# Patient Record
Sex: MALE | Race: WHITE | NOT HISPANIC OR LATINO
[De-identification: names, ages, dates, MRNs, and addresses within clinical notes are randomized per-mention and may not be internally consistent; named-entity substitution may affect disease eponyms.]

---

## 2017-02-03 ENCOUNTER — APPOINTMENT (OUTPATIENT)
Dept: PULMONOLOGY | Facility: CLINIC | Age: 82
End: 2017-02-03

## 2017-02-03 VITALS
SYSTOLIC BLOOD PRESSURE: 120 MMHG | BODY MASS INDEX: 27.28 KG/M2 | OXYGEN SATURATION: 94 % | HEIGHT: 68 IN | WEIGHT: 180 LBS | DIASTOLIC BLOOD PRESSURE: 60 MMHG | HEART RATE: 82 BPM | RESPIRATION RATE: 17 BRPM

## 2017-04-27 ENCOUNTER — FORM ENCOUNTER (OUTPATIENT)
Age: 82
End: 2017-04-27

## 2017-04-28 ENCOUNTER — APPOINTMENT (OUTPATIENT)
Dept: CT IMAGING | Facility: CLINIC | Age: 82
End: 2017-04-28

## 2017-04-28 ENCOUNTER — OUTPATIENT (OUTPATIENT)
Dept: OUTPATIENT SERVICES | Facility: HOSPITAL | Age: 82
LOS: 1 days | End: 2017-04-28
Payer: MEDICARE

## 2017-04-28 DIAGNOSIS — J44.9 CHRONIC OBSTRUCTIVE PULMONARY DISEASE, UNSPECIFIED: ICD-10-CM

## 2017-04-28 PROCEDURE — 71250 CT THORAX DX C-: CPT

## 2017-05-03 ENCOUNTER — APPOINTMENT (OUTPATIENT)
Dept: PULMONOLOGY | Facility: CLINIC | Age: 82
End: 2017-05-03

## 2017-05-03 VITALS
SYSTOLIC BLOOD PRESSURE: 110 MMHG | BODY MASS INDEX: 27.28 KG/M2 | OXYGEN SATURATION: 92 % | HEIGHT: 68 IN | WEIGHT: 180 LBS | RESPIRATION RATE: 16 BRPM | DIASTOLIC BLOOD PRESSURE: 60 MMHG | HEART RATE: 79 BPM

## 2017-06-15 ENCOUNTER — INPATIENT (INPATIENT)
Facility: HOSPITAL | Age: 82
LOS: 3 days | Discharge: HOME | DRG: 309 | End: 2017-06-19
Attending: INTERNAL MEDICINE | Admitting: INTERNAL MEDICINE
Payer: MEDICARE

## 2017-06-15 VITALS
HEART RATE: 82 BPM | DIASTOLIC BLOOD PRESSURE: 74 MMHG | OXYGEN SATURATION: 94 % | SYSTOLIC BLOOD PRESSURE: 179 MMHG | RESPIRATION RATE: 16 BRPM

## 2017-06-15 DIAGNOSIS — J44.9 CHRONIC OBSTRUCTIVE PULMONARY DISEASE, UNSPECIFIED: ICD-10-CM

## 2017-06-15 DIAGNOSIS — E78.5 HYPERLIPIDEMIA, UNSPECIFIED: ICD-10-CM

## 2017-06-15 DIAGNOSIS — Z90.2 ACQUIRED ABSENCE OF LUNG [PART OF]: Chronic | ICD-10-CM

## 2017-06-15 DIAGNOSIS — N18.9 CHRONIC KIDNEY DISEASE, UNSPECIFIED: ICD-10-CM

## 2017-06-15 DIAGNOSIS — I48.91 UNSPECIFIED ATRIAL FIBRILLATION: ICD-10-CM

## 2017-06-15 DIAGNOSIS — I10 ESSENTIAL (PRIMARY) HYPERTENSION: ICD-10-CM

## 2017-06-15 LAB
ALBUMIN SERPL ELPH-MCNC: 4.1 G/DL — SIGNIFICANT CHANGE UP (ref 3.3–5)
ALP SERPL-CCNC: 45 U/L — SIGNIFICANT CHANGE UP (ref 40–120)
ALT FLD-CCNC: 19 U/L RC — SIGNIFICANT CHANGE UP (ref 10–45)
ANION GAP SERPL CALC-SCNC: 13 MMOL/L — SIGNIFICANT CHANGE UP (ref 5–17)
APPEARANCE UR: CLEAR — SIGNIFICANT CHANGE UP
APTT BLD: 28.2 SEC — SIGNIFICANT CHANGE UP (ref 27.5–37.4)
AST SERPL-CCNC: 20 U/L — SIGNIFICANT CHANGE UP (ref 10–40)
BACTERIA # UR AUTO: ABNORMAL /HPF
BASOPHILS # BLD AUTO: 0.1 K/UL — SIGNIFICANT CHANGE UP (ref 0–0.2)
BASOPHILS NFR BLD AUTO: 0.5 % — SIGNIFICANT CHANGE UP (ref 0–2)
BILIRUB SERPL-MCNC: 0.4 MG/DL — SIGNIFICANT CHANGE UP (ref 0.2–1.2)
BILIRUB UR-MCNC: NEGATIVE — SIGNIFICANT CHANGE UP
BUN SERPL-MCNC: 32 MG/DL — HIGH (ref 7–23)
CALCIUM SERPL-MCNC: 10.7 MG/DL — HIGH (ref 8.4–10.5)
CHLORIDE SERPL-SCNC: 101 MMOL/L — SIGNIFICANT CHANGE UP (ref 96–108)
CK MB BLD-MCNC: 4.4 % — HIGH (ref 0–3.5)
CK MB CFR SERPL CALC: 2.3 NG/ML — SIGNIFICANT CHANGE UP (ref 0–6.7)
CK SERPL-CCNC: 52 U/L — SIGNIFICANT CHANGE UP (ref 30–200)
CO2 SERPL-SCNC: 26 MMOL/L — SIGNIFICANT CHANGE UP (ref 22–31)
COLOR SPEC: YELLOW — SIGNIFICANT CHANGE UP
COMMENT - URINE: SIGNIFICANT CHANGE UP
CREAT SERPL-MCNC: 2.41 MG/DL — HIGH (ref 0.5–1.3)
DIFF PNL FLD: NEGATIVE — SIGNIFICANT CHANGE UP
EOSINOPHIL # BLD AUTO: 0.1 K/UL — SIGNIFICANT CHANGE UP (ref 0–0.5)
EOSINOPHIL NFR BLD AUTO: 0.6 % — SIGNIFICANT CHANGE UP (ref 0–6)
GAS PNL BLDV: SIGNIFICANT CHANGE UP
GLUCOSE SERPL-MCNC: 104 MG/DL — HIGH (ref 70–99)
GLUCOSE UR QL: NEGATIVE — SIGNIFICANT CHANGE UP
HCT VFR BLD CALC: 37.5 % — LOW (ref 39–50)
HGB BLD-MCNC: 11.6 G/DL — LOW (ref 13–17)
INR BLD: 0.96 RATIO — SIGNIFICANT CHANGE UP (ref 0.88–1.16)
KETONES UR-MCNC: NEGATIVE — SIGNIFICANT CHANGE UP
LEUKOCYTE ESTERASE UR-ACNC: NEGATIVE — SIGNIFICANT CHANGE UP
LYMPHOCYTES # BLD AUTO: 32.4 % — SIGNIFICANT CHANGE UP (ref 13–44)
LYMPHOCYTES # BLD AUTO: 4.9 K/UL — HIGH (ref 1–3.3)
MCHC RBC-ENTMCNC: 31 PG — SIGNIFICANT CHANGE UP (ref 27–34)
MCHC RBC-ENTMCNC: 31.1 GM/DL — LOW (ref 32–36)
MCV RBC AUTO: 99.7 FL — SIGNIFICANT CHANGE UP (ref 80–100)
MONOCYTES # BLD AUTO: 1.2 K/UL — HIGH (ref 0–0.9)
MONOCYTES NFR BLD AUTO: 8.2 % — SIGNIFICANT CHANGE UP (ref 2–14)
NEUTROPHILS # BLD AUTO: 8.8 K/UL — HIGH (ref 1.8–7.4)
NEUTROPHILS NFR BLD AUTO: 58.3 % — SIGNIFICANT CHANGE UP (ref 43–77)
NITRITE UR-MCNC: NEGATIVE — SIGNIFICANT CHANGE UP
NT-PROBNP SERPL-SCNC: 706 PG/ML — HIGH (ref 0–300)
PH UR: 5.5 — SIGNIFICANT CHANGE UP (ref 5–8)
PLATELET # BLD AUTO: 307 K/UL — SIGNIFICANT CHANGE UP (ref 150–400)
POTASSIUM SERPL-MCNC: 4 MMOL/L — SIGNIFICANT CHANGE UP (ref 3.5–5.3)
POTASSIUM SERPL-SCNC: 4 MMOL/L — SIGNIFICANT CHANGE UP (ref 3.5–5.3)
PROT SERPL-MCNC: 7.4 G/DL — SIGNIFICANT CHANGE UP (ref 6–8.3)
PROT UR-MCNC: 30 MG/DL
PROTHROM AB SERPL-ACNC: 10.4 SEC — SIGNIFICANT CHANGE UP (ref 9.8–12.7)
RBC # BLD: 3.76 M/UL — LOW (ref 4.2–5.8)
RBC # FLD: 13.1 % — SIGNIFICANT CHANGE UP (ref 10.3–14.5)
RBC CASTS # UR COMP ASSIST: SIGNIFICANT CHANGE UP /HPF (ref 0–2)
SODIUM SERPL-SCNC: 140 MMOL/L — SIGNIFICANT CHANGE UP (ref 135–145)
SP GR SPEC: 1.02 — SIGNIFICANT CHANGE UP (ref 1.01–1.02)
TROPONIN T SERPL-MCNC: <0.01 NG/ML — SIGNIFICANT CHANGE UP (ref 0–0.06)
UROBILINOGEN FLD QL: NEGATIVE — SIGNIFICANT CHANGE UP
WBC # BLD: 15.1 K/UL — HIGH (ref 3.8–10.5)
WBC # FLD AUTO: 15.1 K/UL — HIGH (ref 3.8–10.5)
WBC UR QL: SIGNIFICANT CHANGE UP /HPF (ref 0–5)

## 2017-06-15 PROCEDURE — 99223 1ST HOSP IP/OBS HIGH 75: CPT | Mod: 25,AI

## 2017-06-15 PROCEDURE — 93010 ELECTROCARDIOGRAM REPORT: CPT

## 2017-06-15 PROCEDURE — 99285 EMERGENCY DEPT VISIT HI MDM: CPT | Mod: 25,GC

## 2017-06-15 PROCEDURE — 99497 ADVNCD CARE PLAN 30 MIN: CPT

## 2017-06-15 PROCEDURE — 71010: CPT | Mod: 26

## 2017-06-15 RX ORDER — ATORVASTATIN CALCIUM 80 MG/1
20 TABLET, FILM COATED ORAL AT BEDTIME
Qty: 0 | Refills: 0 | Status: DISCONTINUED | OUTPATIENT
Start: 2017-06-15 | End: 2017-06-19

## 2017-06-15 RX ORDER — DEXTROSE 50 % IN WATER 50 %
25 SYRINGE (ML) INTRAVENOUS ONCE
Qty: 0 | Refills: 0 | Status: DISCONTINUED | OUTPATIENT
Start: 2017-06-15 | End: 2017-06-19

## 2017-06-15 RX ORDER — INSULIN LISPRO 100/ML
VIAL (ML) SUBCUTANEOUS
Qty: 0 | Refills: 0 | Status: DISCONTINUED | OUTPATIENT
Start: 2017-06-15 | End: 2017-06-19

## 2017-06-15 RX ORDER — DEXTROSE 50 % IN WATER 50 %
12.5 SYRINGE (ML) INTRAVENOUS ONCE
Qty: 0 | Refills: 0 | Status: DISCONTINUED | OUTPATIENT
Start: 2017-06-15 | End: 2017-06-19

## 2017-06-15 RX ORDER — HEPARIN SODIUM 5000 [USP'U]/ML
5000 INJECTION INTRAVENOUS; SUBCUTANEOUS EVERY 12 HOURS
Qty: 0 | Refills: 0 | Status: DISCONTINUED | OUTPATIENT
Start: 2017-06-15 | End: 2017-06-16

## 2017-06-15 RX ORDER — SODIUM CHLORIDE 9 MG/ML
1000 INJECTION, SOLUTION INTRAVENOUS
Qty: 0 | Refills: 0 | Status: DISCONTINUED | OUTPATIENT
Start: 2017-06-15 | End: 2017-06-19

## 2017-06-15 RX ORDER — DILTIAZEM HCL 120 MG
1 CAPSULE, EXT RELEASE 24 HR ORAL
Qty: 0 | Refills: 0 | COMMUNITY

## 2017-06-15 RX ORDER — FENOFIBRATE,MICRONIZED 130 MG
1 CAPSULE ORAL
Qty: 0 | Refills: 0 | COMMUNITY

## 2017-06-15 RX ORDER — ASPIRIN/CALCIUM CARB/MAGNESIUM 324 MG
81 TABLET ORAL DAILY
Qty: 0 | Refills: 0 | Status: DISCONTINUED | OUTPATIENT
Start: 2017-06-15 | End: 2017-06-16

## 2017-06-15 RX ORDER — FENOFIBRATE,MICRONIZED 130 MG
145 CAPSULE ORAL DAILY
Qty: 0 | Refills: 0 | Status: DISCONTINUED | OUTPATIENT
Start: 2017-06-15 | End: 2017-06-19

## 2017-06-15 RX ORDER — ATORVASTATIN CALCIUM 80 MG/1
1 TABLET, FILM COATED ORAL
Qty: 0 | Refills: 0 | COMMUNITY

## 2017-06-15 RX ORDER — GLUCAGON INJECTION, SOLUTION 0.5 MG/.1ML
1 INJECTION, SOLUTION SUBCUTANEOUS ONCE
Qty: 0 | Refills: 0 | Status: DISCONTINUED | OUTPATIENT
Start: 2017-06-15 | End: 2017-06-19

## 2017-06-15 RX ORDER — DEXTROSE 50 % IN WATER 50 %
1 SYRINGE (ML) INTRAVENOUS ONCE
Qty: 0 | Refills: 0 | Status: DISCONTINUED | OUTPATIENT
Start: 2017-06-15 | End: 2017-06-19

## 2017-06-15 RX ADMIN — HEPARIN SODIUM 5000 UNIT(S): 5000 INJECTION INTRAVENOUS; SUBCUTANEOUS at 23:22

## 2017-06-15 RX ADMIN — ATORVASTATIN CALCIUM 20 MILLIGRAM(S): 80 TABLET, FILM COATED ORAL at 23:22

## 2017-06-15 RX ADMIN — Medication 10 MILLIGRAM(S): at 23:24

## 2017-06-15 NOTE — H&P ADULT - NSHPLABSRESULTS_GEN_ALL_CORE
labs reviewed personally by me with creat 2.41, calcium elevated 10.7, lact 2.3  ekg irregular with period bigeminy and ? period AF though p's seen  CXR negative  4/17 CT chest reviewed

## 2017-06-15 NOTE — H&P ADULT - HISTORY OF PRESENT ILLNESS
89 m ex alcoholic, ex smoker, pre-DM, htn, hld, ckd, copd/emphysema, h/o lung CA s/p LLL wedge resection sent from PMD office with new onset AF.  Pt has been feeling more ROWELL x 1 week with mild swelling of LEs.  Noted elevated BP monday night to 192/79 taken in [place] but Tuesday morning repeat 150/79.  ROWELL persisted and pt noted a morning cough that cleared upon getting up.    Pt started night home o2 following LLL wedge resection though progressed to requiring oxygen for 20 hrs/day after a hospitalization 1.5 yrs ago for PNA when orthopnea requiring a wedge and mild rowell became chronic.    No f/c/palps/cp/PND/worsening orthopnea.  Chronic nocturia unchanged.      In /74, hr 82, temp 36.7, sat 94-97 RA, rr 18  Given asa 81mg  EKG ? bigeminy, + some p's, + period of AF.  per tele tech pt has been irregular since admission 89 m ex alcoholic, ex smoker, pre-DM, htn, hld, ckd, copd/emphysema, h/o lung CA s/p LLL wedge resection sent from PMD office with new onset AF.  Pt has been feeling more ROWELL x 1 week with mild swelling of LEs.  Noted elevated BP monday night to 192/79 taken in [place] but Tuesday morning repeat 150/79.  ROWELL persisted and pt noted a morning cough that cleared upon getting up.    Pt started night home o2 following LLL wedge resection though progressed to requiring oxygen for 20 hrs/day after a hospitalization 1.5 yrs ago for PNA when orthopnea requiring a wedge and mild rowell became chronic.    No f/c/palps/cp/PND/worsening orthopnea.  Chronic nocturia unchanged.    Pt notes he was recently started on a new inhaler but does not remember name.  Uses [facility] [phone]    In /74, hr 82, temp 36.7, sat 94-97 RA, rr 18  Given asa 81mg  EKG ? bigeminy, + some p's, + period of AF.  per tele tech pt has been irregular since admission

## 2017-06-15 NOTE — H&P ADULT - PMH
Chronic kidney disease, unspecified stage    Chronic obstructive pulmonary disease, unspecified COPD type    HLD (hyperlipidemia)    HTN (hypertension)

## 2017-06-15 NOTE — ED PROVIDER NOTE - ATTENDING CONTRIBUTION TO CARE
89 yo male with presents from Dr. Anguiano's office for new onset afib. Patient has no known hx of afib or chf. No prthopnea. But + LE edema and ROWELL and SOB. No CP. No GI bleeding. No extra caffeine, no benzos, + PO. On exam, AVSS, slight crackles to bases, s1, s2, irregular hr, soft nt nd abdomen, no ascites. no pallor.

## 2017-06-15 NOTE — H&P ADULT - NSHPSOCIALHISTORY_GEN_ALL_CORE
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

## 2017-06-15 NOTE — H&P ADULT - PROBLEM SELECTOR PLAN 2
call pharmacy in am to confirm inhaler  cont NC 2L/min  monitor oxygenation  cont prednisone started 1 mo ago for control of sx

## 2017-06-15 NOTE — ED ADULT NURSE NOTE - OBJECTIVE STATEMENT
89 y/o male came in via ems c/o dyspnea on exertion. pt states he was having dyspnea on exertion for a few months. pt states he went to his dr and had an ekg done and was told he has new onset afib. pt denies any chest pain no n/v/d no fevers no chills. pt o2 sat normal on RA. no resp distress.

## 2017-06-15 NOTE — ED PROVIDER NOTE - OBJECTIVE STATEMENT
Nancy Davila M.D: 89yoM hx HTN, HLD, DM, sent in from PMD's office, with reported new AFib. pt notes increasing dyspnea on exertion over the last week or two. notes increased salt intake as well. denies abdominal pain N/V/C/D, fevers/chills. no cp. also notes some increasing LE edema as well  PMD: hank Anguiano

## 2017-06-15 NOTE — H&P ADULT - PROBLEM SELECTOR PLAN 5
monitor bp on diltiazem but off valsartan and hctz  confirm with pmd if renal fx is at baseline restarting appropriate bp regimen

## 2017-06-15 NOTE — ED PROVIDER NOTE - PHYSICAL EXAMINATION
Nancy Davila M.D.:   patient awake alert seen lying on stretcher NAD .   LUNGS crackles at bases b/l.   CARD irregularly irregular no m/r/g.    Abdomen soft obese NT ND no rebound no guarding no CVA tenderness.   EXT WWP b/l LE pitting edema no calf tenderness CV 2+DP/PT bilaterally.   neuro A&Ox3 gait normal.    skin warm and dry no rash  HEENT: moist mucous membranes, PERRL, EOMI

## 2017-06-15 NOTE — H&P ADULT - PROBLEM SELECTOR PLAN 1
pt already on diltiazem and rate is controlled  began discussion of anticoagulation with pt and son and will consider risks/benefits and d/w PMD in AM  ck tsh  ck TTE  likely 2nd chronic copd/pulmonary dz, ? pulmonary htn

## 2017-06-15 NOTE — H&P ADULT - ASSESSMENT
89 m a/w new onset AF  Began discussions of goals of care/advanced directives > 30 minutes.  pt son is HCP (Gera Andersen [phone]).  Pt would want to be full code at this time.

## 2017-06-15 NOTE — PATIENT PROFILE ADULT. - VISION (WITH CORRECTIVE LENSES IF THE PATIENT USUALLY WEARS THEM):
Partially impaired: cannot see medication labels or newsprint, but can see obstacles in path, and the surrounding layout; can count fingers at arm's length/Wears glasses for reading

## 2017-06-16 DIAGNOSIS — N18.3 CHRONIC KIDNEY DISEASE, STAGE 3 (MODERATE): ICD-10-CM

## 2017-06-16 DIAGNOSIS — E78.4 OTHER HYPERLIPIDEMIA: ICD-10-CM

## 2017-06-16 LAB
ANION GAP SERPL CALC-SCNC: 12 MMOL/L — SIGNIFICANT CHANGE UP (ref 5–17)
BASOPHILS # BLD AUTO: 0 K/UL — SIGNIFICANT CHANGE UP (ref 0–0.2)
BASOPHILS NFR BLD AUTO: 0.3 % — SIGNIFICANT CHANGE UP (ref 0–2)
BUN SERPL-MCNC: 37 MG/DL — HIGH (ref 7–23)
CA-I BLD-SCNC: 1.29 MMOL/L — SIGNIFICANT CHANGE UP (ref 1.12–1.3)
CALCIUM SERPL-MCNC: 9.8 MG/DL — SIGNIFICANT CHANGE UP (ref 8.4–10.5)
CHLORIDE SERPL-SCNC: 97 MMOL/L — SIGNIFICANT CHANGE UP (ref 96–108)
CK MB CFR SERPL CALC: 2.3 NG/ML — SIGNIFICANT CHANGE UP (ref 0–6.7)
CK MB CFR SERPL CALC: 2.6 NG/ML — SIGNIFICANT CHANGE UP (ref 0–6.7)
CK SERPL-CCNC: 49 U/L — SIGNIFICANT CHANGE UP (ref 30–200)
CK SERPL-CCNC: 58 U/L — SIGNIFICANT CHANGE UP (ref 30–200)
CO2 SERPL-SCNC: 27 MMOL/L — SIGNIFICANT CHANGE UP (ref 22–31)
CREAT SERPL-MCNC: 2.34 MG/DL — HIGH (ref 0.5–1.3)
CULTURE RESULTS: SIGNIFICANT CHANGE UP
EOSINOPHIL # BLD AUTO: 0 K/UL — SIGNIFICANT CHANGE UP (ref 0–0.5)
EOSINOPHIL NFR BLD AUTO: 0.5 % — SIGNIFICANT CHANGE UP (ref 0–6)
GLUCOSE SERPL-MCNC: 209 MG/DL — HIGH (ref 70–99)
HBA1C BLD-MCNC: 6.4 % — HIGH (ref 4–5.6)
HCT VFR BLD CALC: 33.3 % — LOW (ref 39–50)
HGB BLD-MCNC: 11 G/DL — LOW (ref 13–17)
LYMPHOCYTES # BLD AUTO: 1.7 K/UL — SIGNIFICANT CHANGE UP (ref 1–3.3)
LYMPHOCYTES # BLD AUTO: 16.4 % — SIGNIFICANT CHANGE UP (ref 13–44)
MAGNESIUM SERPL-MCNC: 1.9 MG/DL — SIGNIFICANT CHANGE UP (ref 1.6–2.6)
MCHC RBC-ENTMCNC: 32.8 PG — SIGNIFICANT CHANGE UP (ref 27–34)
MCHC RBC-ENTMCNC: 33.1 GM/DL — SIGNIFICANT CHANGE UP (ref 32–36)
MCV RBC AUTO: 99.2 FL — SIGNIFICANT CHANGE UP (ref 80–100)
MONOCYTES # BLD AUTO: 0.3 K/UL — SIGNIFICANT CHANGE UP (ref 0–0.9)
MONOCYTES NFR BLD AUTO: 2.4 % — SIGNIFICANT CHANGE UP (ref 2–14)
NEUTROPHILS # BLD AUTO: 8.5 K/UL — HIGH (ref 1.8–7.4)
NEUTROPHILS NFR BLD AUTO: 80.4 % — HIGH (ref 43–77)
PHOSPHATE SERPL-MCNC: 3.3 MG/DL — SIGNIFICANT CHANGE UP (ref 2.5–4.5)
PLATELET # BLD AUTO: 287 K/UL — SIGNIFICANT CHANGE UP (ref 150–400)
POTASSIUM SERPL-MCNC: 4.5 MMOL/L — SIGNIFICANT CHANGE UP (ref 3.5–5.3)
POTASSIUM SERPL-SCNC: 4.5 MMOL/L — SIGNIFICANT CHANGE UP (ref 3.5–5.3)
RBC # BLD: 3.36 M/UL — LOW (ref 4.2–5.8)
RBC # FLD: 12.9 % — SIGNIFICANT CHANGE UP (ref 10.3–14.5)
SODIUM SERPL-SCNC: 136 MMOL/L — SIGNIFICANT CHANGE UP (ref 135–145)
SPECIMEN SOURCE: SIGNIFICANT CHANGE UP
TROPONIN T SERPL-MCNC: <0.01 NG/ML — SIGNIFICANT CHANGE UP (ref 0–0.06)
TROPONIN T SERPL-MCNC: <0.01 NG/ML — SIGNIFICANT CHANGE UP (ref 0–0.06)
TSH SERPL-MCNC: 2.65 UIU/ML — SIGNIFICANT CHANGE UP (ref 0.27–4.2)
WBC # BLD: 10.6 K/UL — HIGH (ref 3.8–10.5)
WBC # FLD AUTO: 10.6 K/UL — HIGH (ref 3.8–10.5)

## 2017-06-16 PROCEDURE — 99222 1ST HOSP IP/OBS MODERATE 55: CPT | Mod: GC

## 2017-06-16 PROCEDURE — 93306 TTE W/DOPPLER COMPLETE: CPT | Mod: 26

## 2017-06-16 RX ORDER — DILTIAZEM HCL 120 MG
120 CAPSULE, EXT RELEASE 24 HR ORAL DAILY
Qty: 0 | Refills: 0 | Status: DISCONTINUED | OUTPATIENT
Start: 2017-06-16 | End: 2017-06-19

## 2017-06-16 RX ORDER — APIXABAN 2.5 MG/1
2.5 TABLET, FILM COATED ORAL
Qty: 0 | Refills: 0 | Status: DISCONTINUED | OUTPATIENT
Start: 2017-06-16 | End: 2017-06-19

## 2017-06-16 RX ADMIN — Medication 120 MILLIGRAM(S): at 05:30

## 2017-06-16 RX ADMIN — HEPARIN SODIUM 5000 UNIT(S): 5000 INJECTION INTRAVENOUS; SUBCUTANEOUS at 05:30

## 2017-06-16 RX ADMIN — Medication 145 MILLIGRAM(S): at 12:22

## 2017-06-16 RX ADMIN — Medication 10 MILLIGRAM(S): at 05:30

## 2017-06-16 RX ADMIN — Medication 1: at 08:14

## 2017-06-16 RX ADMIN — APIXABAN 2.5 MILLIGRAM(S): 2.5 TABLET, FILM COATED ORAL at 17:15

## 2017-06-16 RX ADMIN — ATORVASTATIN CALCIUM 20 MILLIGRAM(S): 80 TABLET, FILM COATED ORAL at 21:46

## 2017-06-16 NOTE — CONSULT NOTE ADULT - ASSESSMENT
Patient is a 89y male with history of essential hypertension, coronary calcification and COPD/lung cancer admitted with new onset atrial fibrillation. His rate is controlled and does not appear volume overloaded on exam today. He is elderly with CKD and has increased bleeding risk. Would start NOAC at adjusted dose and continue rate control. Will obtain non-invasive testing.

## 2017-06-16 NOTE — PROGRESS NOTE ADULT - SUBJECTIVE AND OBJECTIVE BOX
Male  Patient is a 89y old  Male who presents with a chief complaint of Noted to be in AFib at PMD office, reffered to come to ED for evaluation (15 Denis 2017 21:38)      HPI:  89 m ex alcoholic, ex smoker, pre-DM, htn, hld, ckd, copd/emphysema, h/o lung CA s/p LLL wedge resection sent from PMD office with new onset AF.  Pt has been feeling more ROWELL x 1 week with mild swelling of LEs.  Noted elevated BP monday night to 192/79 taken in [place] but Tuesday morning repeat 150/79.  ROWELL persisted and pt noted a morning cough that cleared upon getting up.    Pt started night home o2 following LLL wedge resection though progressed to requiring oxygen for 20 hrs/day after a hospitalization 1.5 yrs ago for PNA when orthopnea requiring a wedge and mild rowell became chronic.    No f/c/palps/cp/PND/worsening orthopnea.  Chronic nocturia unchanged.    Pt notes he was recently started on a new inhaler but does not remember name.  Uses [facility] [phone]    In office yesterday presented with LE edema, SOB at exertion and rest, and new AF with controlled HR  Now much more comfortable.  no c/p, SOB  Telemetry shows SR.    In /74, hr 82, temp 36.7, sat 94-97 RA, rr 18  Given asa 81mg  EKG ? bigeminy, + some p's, + period of AF.  per tele tech pt has been irregular since admission (15 Denis 2017 21:32)      	    Vital Signs Last 24 Hrs  T(C): 36.8, Max: 36.8 (06-16 @ 04:25)  T(F): 98.2, Max: 98.2 (06-16 @ 04:25)  HR: 71 (63 - 82)  BP: 159/68 (135/93 - 183/48)  BP(mean): --  RR: 18 (16 - 18)  SpO2: 95% (94% - 100%)  Daily Height in cm: 172.72 (15 Denis 2017 22:54)    Daily     I & Os for current day (as of 06-16 @ 07:59)  =============================================  IN: 340 ml / OUT: 200 ml / NET: 140 ml      PHYSICAL EXAM:      Constitutional: No fevers, chills      Neck: No increased JVP      Respiratory:  Luns CLEAR B/L    Cardiovascular:  RRR no murmur/ gallop    Gastrointestinal:  Soft/ NT    Extremities: no-tr. edema      Neurological: No global or focal deficits                              11.6   15.1  )-----------( 307      ( 15 Denis 2017 15:36 )             37.5     06-15    140  |  101  |  32<H>  ----------------------------<  104<H>  4.0   |  26  |  2.41<H>    Ca    10.7<H>      15 Denis 2017 15:36    TPro  7.4  /  Alb  4.1  /  TBili  0.4  /  DBili  x   /  AST  20  /  ALT  19  /  AlkPhos  45  06-15      PT/INR - ( 15 Denis 2017 15:36 )   PT: 10.4 sec;   INR: 0.96 ratio         PTT - ( 15 Denis 2017 15:36 )  PTT:28.2 sec    MEDICATIONS  (STANDING):  insulin lispro (HumaLOG) corrective regimen sliding scale  SubCutaneous three times a day before meals  dextrose 5%. 1000milliLiter(s) IV Continuous <Continuous>  dextrose 50% Injectable 12.5Gram(s) IV Push once  dextrose 50% Injectable 25Gram(s) IV Push once  dextrose 50% Injectable 25Gram(s) IV Push once  predniSONE   Tablet 10milliGRAM(s) Oral daily  atorvastatin 20milliGRAM(s) Oral at bedtime  fenofibrate Tablet 145milliGRAM(s) Oral daily  diltiazem   CD 120milliGRAM(s) Oral daily  apixaban 2.5milliGRAM(s) Oral two times a day    MEDICATIONS  (PRN):  dextrose Gel 1Dose(s) Oral once PRN Blood Glucose LESS THAN 70 milliGRAM(s)/deciliter  glucagon  Injectable 1milliGRAM(s) IntraMuscular once PRN Glucose LESS THAN 70 milligrams/deciliter      Impression;    89M h/o coronary artery calcification, COPD, lung CA s/p LLL resection, CKD IV admitted with new AF and mild CHF, both of which have resolved nicely  Appreciate cardiology input and case was discussed yesterday with Dr. Watts.    Plan:    Obtain echo  Cardiology f/u  Eliquis 2.5mg bid  Continue cardizem for rate control  Telemetry monitoring  SHAWNEE Anguiano MD  [phone]

## 2017-06-16 NOTE — CONSULT NOTE ADULT - PROBLEM SELECTOR RECOMMENDATION 9
in the setting of long-standing HTN, DM, pt. with hx of lung cancer. Pt. baseline Scr 2.3 as seen on allscripts. Pt. with stable Scr of 2.4. Pt. non-oliguric with good urine output. Monitor BMP, urine output, I/OS, Avoid nephrotoxins, RCA, NSAIDS, ACEI-I/ARBS.
Rate controlled. Elevated [id number] Vasc score and bleeding risk as well.  Rec:  Start eliquis 2.5mg bid  Stop aspirin  Continue cardizem for rate control  TTE  Monitor on telemetry

## 2017-06-17 LAB
ANION GAP SERPL CALC-SCNC: 17 MMOL/L — SIGNIFICANT CHANGE UP (ref 5–17)
BUN SERPL-MCNC: 37 MG/DL — HIGH (ref 7–23)
CALCIUM SERPL-MCNC: 9.5 MG/DL — SIGNIFICANT CHANGE UP (ref 8.4–10.5)
CHLORIDE SERPL-SCNC: 99 MMOL/L — SIGNIFICANT CHANGE UP (ref 96–108)
CO2 SERPL-SCNC: 22 MMOL/L — SIGNIFICANT CHANGE UP (ref 22–31)
CREAT SERPL-MCNC: 2.22 MG/DL — HIGH (ref 0.5–1.3)
GLUCOSE SERPL-MCNC: 110 MG/DL — HIGH (ref 70–99)
HCT VFR BLD CALC: 31.6 % — LOW (ref 39–50)
HGB BLD-MCNC: 10.5 G/DL — LOW (ref 13–17)
MCHC RBC-ENTMCNC: 31.8 PG — SIGNIFICANT CHANGE UP (ref 27–34)
MCHC RBC-ENTMCNC: 33.2 GM/DL — SIGNIFICANT CHANGE UP (ref 32–36)
MCV RBC AUTO: 95.8 FL — SIGNIFICANT CHANGE UP (ref 80–100)
PLATELET # BLD AUTO: 277 K/UL — SIGNIFICANT CHANGE UP (ref 150–400)
POTASSIUM SERPL-MCNC: 4.5 MMOL/L — SIGNIFICANT CHANGE UP (ref 3.5–5.3)
POTASSIUM SERPL-SCNC: 4.5 MMOL/L — SIGNIFICANT CHANGE UP (ref 3.5–5.3)
RBC # BLD: 3.3 M/UL — LOW (ref 4.2–5.8)
RBC # FLD: 15.1 % — HIGH (ref 10.3–14.5)
SODIUM SERPL-SCNC: 138 MMOL/L — SIGNIFICANT CHANGE UP (ref 135–145)
WBC # BLD: 12.24 K/UL — HIGH (ref 3.8–10.5)
WBC # FLD AUTO: 12.24 K/UL — HIGH (ref 3.8–10.5)

## 2017-06-17 RX ADMIN — Medication 120 MILLIGRAM(S): at 05:51

## 2017-06-17 RX ADMIN — ATORVASTATIN CALCIUM 20 MILLIGRAM(S): 80 TABLET, FILM COATED ORAL at 21:22

## 2017-06-17 RX ADMIN — Medication 145 MILLIGRAM(S): at 12:26

## 2017-06-17 RX ADMIN — APIXABAN 2.5 MILLIGRAM(S): 2.5 TABLET, FILM COATED ORAL at 18:36

## 2017-06-17 RX ADMIN — APIXABAN 2.5 MILLIGRAM(S): 2.5 TABLET, FILM COATED ORAL at 05:51

## 2017-06-17 RX ADMIN — Medication 10 MILLIGRAM(S): at 05:50

## 2017-06-17 NOTE — PROGRESS NOTE ADULT - SUBJECTIVE AND OBJECTIVE BOX
SUBJECTIVE / OVERNIGHT EVENTS: No nausea, vomiting or diarrhea, no fever or chills, no dizziness or chest pain, no dysuria or hematuria .  ,  tele  , nsr  Vital Signs Last 24 Hrs  T(C): 36.7, Max: 36.7 (06-16 @ 20:21)  HR: 60 (55 - 84)  BP: 132/54 (115/46 - 179/76)  RR: 18 (17 - 20)  SpO2: 96% (93% - 100%)  Wt(kg): --  CAPILLARY BLOOD GLUCOSE  114 (2017 07:41)  190 (2017 21:20)  102 (2017 16:19)  117 (2017 11:55)    I&O's Summary    I & Os for current day (as of 2017 10:18)  =============================================  IN: 840 ml / OUT: 2105 ml / NET: -1265 ml      PHYSICAL EXAM:  HEAD:  Atraumatic, Normocephalic  EYES: EOMI, PERRLA, conjunctiva and sclera clear  NECK: Supple, No JVD  CHEST/LUNG: Clear to auscultation bilaterally; No wheeze  HEART: Regular rate and rhythm; No murmurs, rubs, or gallops  ABDOMEN: Soft, Nontender, Nondistended; Bowel sounds present  EXTREMITIES:  2+ Peripheral Pulses, No clubbing, cyanosis, or edema  PSYCH: AAOx3  NEUROLOGY: non-focal  SKIN: No rashes or lesions    MEDICATIONS  (STANDING):  insulin lispro (HumaLOG) corrective regimen sliding scale  SubCutaneous three times a day before meals  dextrose 5%. 1000milliLiter(s) IV Continuous <Continuous>  dextrose 50% Injectable 12.5Gram(s) IV Push once  dextrose 50% Injectable 25Gram(s) IV Push once  dextrose 50% Injectable 25Gram(s) IV Push once  predniSONE   Tablet 10milliGRAM(s) Oral daily  atorvastatin 20milliGRAM(s) Oral at bedtime  fenofibrate Tablet 145milliGRAM(s) Oral daily  diltiazem   CD 120milliGRAM(s) Oral daily  apixaban 2.5milliGRAM(s) Oral two times a day    MEDICATIONS  (PRN):  dextrose Gel 1Dose(s) Oral once PRN Blood Glucose LESS THAN 70 milliGRAM(s)/deciliter  glucagon  Injectable 1milliGRAM(s) IntraMuscular once PRN Glucose LESS THAN 70 milligrams/deciliter      LABS:                        10.5   12.24 )-----------( 277      ( 2017 08:27 )             31.6         138  |  99  |  37<H>  ----------------------------<  110<H>  4.5   |  22  |  2.22<H>    Ca    9.5      2017 08:23  Phos  3.3       Mg     1.9         TPro  7.4  /  Alb  4.1  /  TBili  0.4  /  DBili  x   /  AST  20  /  ALT  19  /  AlkPhos  45  15    PT/INR - ( 15 Denis 2017 15:36 )   PT: 10.4 sec;   INR: 0.96 ratio         PTT - ( 15 Denis 2017 15:36 )  PTT:28.2 sec  CARDIAC MARKERS ( 2017 10:33 )  x     / x     / 49 U/L / x     / x      CARDIAC MARKERS ( 2017 10:31 )  x     / <0.01 ng/mL / x     / x     / 2.3 ng/mL  CARDIAC MARKERS ( 15 Denis 2017 23:58 )  x     / <0.01 ng/mL / 58 U/L / x     / 2.6 ng/mL  CARDIAC MARKERS ( 15 Denis 2017 15:36 )  x     / <0.01 ng/mL / 52 U/L / x     / 2.3 ng/mL      Urinalysis Basic - ( 15 Denis 2017 22:01 )    Color: Yellow / Appearance: Clear / S.019 / pH: x  Gluc: x / Ketone: Negative  / Bili: Negative / Urobili: Negative   Blood: x / Protein: 30 mg/dL / Nitrite: Negative   Leuk Esterase: Negative / RBC: 0-2 /HPF / WBC 3-5 /HPF   Sq Epi: x / Non Sq Epi: x / Bacteria: Few /HPF          06-15 @ 15:36  4.8  26    Hemoglobin A1C, Whole Blood: 6.4 % ( @ 14:02)    Thyroid Stimulating Hormone, Serum: 2.65 uIU/mL ( @ 11:57)      Cultures:    EKG:    Radiological Studies:    Consultant(s) Notes Reviewed:      Care Discussed with Consultants/Other Providers:

## 2017-06-17 NOTE — PROVIDER CONTACT NOTE (OTHER) - ASSESSMENT
Pt NSR on tele (HR 60s). VS: /46; HR 65; RR 17; spO2 93% on 2 liters NC; Temp 98.1 F. Pt denies cp/sob/distress at this time. Pt was asleep during time of event.

## 2017-06-17 NOTE — PROGRESS NOTE ADULT - ASSESSMENT
afib, now  in  nsr  on tele,  hr  was  42  last  night on cardizem  and  eliquis, , doing better, ,  c/c  anemia,  emhysema with  hineycombing on  ct, old

## 2017-06-17 NOTE — PROGRESS NOTE ADULT - SUBJECTIVE AND OBJECTIVE BOX
VALENTIN BARROW    Patient is a 89y old  Male who presents with a chief complaint of Noted to be in AFib at PMD office,CORD,diastolic CHF.      Allergies    penicillin (Rash)    Intolerances      MEDICATIONS  (STANDING):  insulin lispro (HumaLOG) corrective regimen sliding scale  SubCutaneous three times a day before meals  dextrose 5%. 1000milliLiter(s) IV Continuous <Continuous>  dextrose 50% Injectable 12.5Gram(s) IV Push once  dextrose 50% Injectable 25Gram(s) IV Push once  dextrose 50% Injectable 25Gram(s) IV Push once  predniSONE   Tablet 10milliGRAM(s) Oral daily  atorvastatin 20milliGRAM(s) Oral at bedtime  fenofibrate Tablet 145milliGRAM(s) Oral daily  diltiazem   CD 120milliGRAM(s) Oral daily  apixaban 2.5milliGRAM(s) Oral two times a day    MEDICATIONS  (PRN):  dextrose Gel 1Dose(s) Oral once PRN Blood Glucose LESS THAN 70 milliGRAM(s)/deciliter  glucagon  Injectable 1milliGRAM(s) IntraMuscular once PRN Glucose LESS THAN 70 milligrams/deciliter    ROWELL    General: Denies weight loss, fevers, rash, decreased hearing  Cardiac: Denies chest pain,  orthopnea, PND, claudication, edema, snoring, daytime somnolence, palpitations, syncope  Resp: Denies  cough, sputum, wheezing, hemoptysis  GI: Denies change in bowel habits, diarrhea, weight loss, melena, tarry stools,   nausea, vomiting, jaundice, abdominal pain, dysphagia  : Denies dysuria, nocturia, hematuria  Neuro: Denies tinnitus, headache, visual changes, weakness, dizziness or vertigo  Musculoskeletal: Denies neck pain back pain joint pain.  Skin: Denies rash, itching, dryness.  Endocrine: Denies polydipsia, polyuria  Psychiatric: Denies depression, anxiety      PHYSICAL EXAM:  Vital Signs Last 24 Hrs  T(C): 36.7, Max: 36.7 (06-16 @ 20:21)  T(F): 98.1, Max: 98.1 (06-16 @ 20:21)  HR: 60 (60 - 84)  BP: 162/67 (115/46 - 179/76)  BP(mean): --  RR: 18 (17 - 20)  SpO2: 100% (93% - 100%)  Daily     Daily   I&O's Summary  I & Os for 24h ending 2017 07:00  =============================================  IN: 340 ml / OUT: 200 ml / NET: 140 ml    I & Os for current day (as of 2017 05:49)  =============================================  IN: 840 ml / OUT: 2105 ml / NET: -1265 ml      General Appearance: 	 Alert, cooperative, no distress  HEENT: normocephalic, atraumatic, PERRLA, EOMI, conjunctiva normal, sclera anicteric,   Neck: no JVD,  carotid 2+  bilaterally without bruits, thyroid normal to inspection and palpation, no adenopathy, trachea midline  Lungs:   BS,no rales  Cor:  pmi 5th ICS MCL, regular rate and rhythm, S1 normal intensity, S2 normal intensity, no gallops, murmurs or rubs  Abdomen:	 soft, non-tender; bowel sounds normal; no masses,  no organomegaly  Extremities: without cyanosis, clubbing or edema  Vasc: 2-+ PT and DP pulses; no varicosities  Neurologic: A&O x 3 (time, place, person). Symmetric strength; limited exam  Musculoskeletal: no kyphosis, scoliosis; normal gait, normal tone  Skin: no rashes; limited exam    Telemetry: SB,no atrial Fib    Labs:  CBC Full  -  ( 2017 10:34 )  WBC Count : 10.6 K/uL  Hemoglobin : 11.0 g/dL  Hematocrit : 33.3 %  Platelet Count - Automated : 287 K/uL  Mean Cell Volume : 99.2 fl  Mean Cell Hemoglobin : 32.8 pg  Mean Cell Hemoglobin Concentration : 33.1 gm/dL  Auto Neutrophil # : 8.5 K/uL  Auto Lymphocyte # : 1.7 K/uL  Auto Monocyte # : 0.3 K/uL  Auto Eosinophil # : 0.0 K/uL  Auto Basophil # : 0.0 K/uL  Auto Neutrophil % : 80.4 %  Auto Lymphocyte % : 16.4 %  Auto Monocyte % : 2.4 %  Auto Eosinophil % : 0.5 %  Auto Basophil % : 0.3 %      CARDIAC MARKERS ( 2017 10:33 )  x     / x     / 49 U/L / x     / x      CARDIAC MARKERS ( 2017 10:31 )  x     / <0.01 ng/mL / x     / x     / 2.3 ng/mL  CARDIAC MARKERS ( 15 Denis 2017 23:58 )  x     / <0.01 ng/mL / 58 U/L / x     / 2.6 ng/mL  CARDIAC MARKERS ( 15 Denis 2017 15:36 )  x     / <0.01 ng/mL / 52 U/L / x     / 2.3 ng/mL      PT/INR - ( 15 Denis 2017 15:36 )   PT: 10.4 sec;   INR: 0.96 ratio         PTT - ( 15 Denis 2017 15:36 )  PTT:28.2 sec  Urinalysis Basic - ( 15 Denis 2017 22:01 )    Color: Yellow / Appearance: Clear / S.019 / pH: x  Gluc: x / Ketone: Negative  / Bili: Negative / Urobili: Negative   Blood: x / Protein: 30 mg/dL / Nitrite: Negative   Leuk Esterase: Negative / RBC: 0-2 /HPF / WBC 3-5 /HPF   Sq Epi: x / Non Sq Epi: x / Bacteria: Few /HPF        Impression/Plan:Patient with COPD,PAF and diastolic DysFX.,nl LV FX.Now in sinus bradycardia.CPK,Trop. negative X 3.Continue Diltiazem for HR and BP control.Continue elquis for anticoagulation.OOB,ambulation.        Cisco Arce MD, [facility]  [facility]

## 2017-06-17 NOTE — CHART NOTE - NSCHARTNOTEFT_GEN_A_CORE
Patient had a brief episode of Bradycardia 42 for 1 beat while sleeping and resumed to baseline rate spontaneously. Denies, cp, sob, palpitations, HA, n/v dizziness, lightheadedness.  Plan  Will continue to monitor.  Will Endorse to oncoming shift, attending to follow. Spectra # [id number]

## 2017-06-18 LAB
ANION GAP SERPL CALC-SCNC: 16 MMOL/L — SIGNIFICANT CHANGE UP (ref 5–17)
BUN SERPL-MCNC: 40 MG/DL — HIGH (ref 7–23)
CALCIUM SERPL-MCNC: 9.6 MG/DL — SIGNIFICANT CHANGE UP (ref 8.4–10.5)
CHLORIDE SERPL-SCNC: 96 MMOL/L — SIGNIFICANT CHANGE UP (ref 96–108)
CO2 SERPL-SCNC: 23 MMOL/L — SIGNIFICANT CHANGE UP (ref 22–31)
CREAT SERPL-MCNC: 2.29 MG/DL — HIGH (ref 0.5–1.3)
GLUCOSE SERPL-MCNC: 113 MG/DL — HIGH (ref 70–99)
HCT VFR BLD CALC: 32 % — LOW (ref 39–50)
HGB BLD-MCNC: 10.4 G/DL — LOW (ref 13–17)
MCHC RBC-ENTMCNC: 31.3 PG — SIGNIFICANT CHANGE UP (ref 27–34)
MCHC RBC-ENTMCNC: 32.5 GM/DL — SIGNIFICANT CHANGE UP (ref 32–36)
MCV RBC AUTO: 96.4 FL — SIGNIFICANT CHANGE UP (ref 80–100)
PLATELET # BLD AUTO: 285 K/UL — SIGNIFICANT CHANGE UP (ref 150–400)
POTASSIUM SERPL-MCNC: 4.3 MMOL/L — SIGNIFICANT CHANGE UP (ref 3.5–5.3)
POTASSIUM SERPL-SCNC: 4.3 MMOL/L — SIGNIFICANT CHANGE UP (ref 3.5–5.3)
RBC # BLD: 3.32 M/UL — LOW (ref 4.2–5.8)
RBC # FLD: 15.1 % — HIGH (ref 10.3–14.5)
SODIUM SERPL-SCNC: 135 MMOL/L — SIGNIFICANT CHANGE UP (ref 135–145)
WBC # BLD: 12 K/UL — HIGH (ref 3.8–10.5)
WBC # FLD AUTO: 12 K/UL — HIGH (ref 3.8–10.5)

## 2017-06-18 PROCEDURE — 99223 1ST HOSP IP/OBS HIGH 75: CPT | Mod: GC

## 2017-06-18 RX ADMIN — Medication 10 MILLIGRAM(S): at 05:48

## 2017-06-18 RX ADMIN — APIXABAN 2.5 MILLIGRAM(S): 2.5 TABLET, FILM COATED ORAL at 17:36

## 2017-06-18 RX ADMIN — APIXABAN 2.5 MILLIGRAM(S): 2.5 TABLET, FILM COATED ORAL at 05:48

## 2017-06-18 RX ADMIN — Medication 120 MILLIGRAM(S): at 05:48

## 2017-06-18 RX ADMIN — ATORVASTATIN CALCIUM 20 MILLIGRAM(S): 80 TABLET, FILM COATED ORAL at 21:32

## 2017-06-18 RX ADMIN — Medication 1: at 11:47

## 2017-06-18 RX ADMIN — Medication 145 MILLIGRAM(S): at 11:24

## 2017-06-18 NOTE — PROGRESS NOTE ADULT - SUBJECTIVE AND OBJECTIVE BOX
VALENTIN BARROW    Patient is a 89y old  Male who presents with a chief complaint SOB,PAF.    Allergies    penicillin (Rash)    Intolerances      MEDICATIONS  (STANDING):  insulin lispro (HumaLOG) corrective regimen sliding scale  SubCutaneous three times a day before meals  dextrose 5%. 1000milliLiter(s) IV Continuous <Continuous>  dextrose 50% Injectable 12.5Gram(s) IV Push once  dextrose 50% Injectable 25Gram(s) IV Push once  dextrose 50% Injectable 25Gram(s) IV Push once  predniSONE   Tablet 10milliGRAM(s) Oral daily  atorvastatin 20milliGRAM(s) Oral at bedtime  fenofibrate Tablet 145milliGRAM(s) Oral daily  diltiazem   CD 120milliGRAM(s) Oral daily  apixaban 2.5milliGRAM(s) Oral two times a day    MEDICATIONS  (PRN):  dextrose Gel 1Dose(s) Oral once PRN Blood Glucose LESS THAN 70 milliGRAM(s)/deciliter  glucagon  Injectable 1milliGRAM(s) IntraMuscular once PRN Glucose LESS THAN 70 milligrams/deciliter        General: Denies weight loss, fevers, rash, decreased hearing  Cardiac: Denies chest pain, SOB, ROWELL, orthopnea, PND, claudication, edema, snoring, daytime somnolence, palpitations, syncope  Resp: Denies SOB, ROWELL, cough, sputum, wheezing, hemoptysis  GI: Denies change in bowel habits, diarrhea, weight loss, melena, tarry stools,   nausea, vomiting, jaundice, abdominal pain, dysphagia  : Denies dysuria, nocturia, hematuria  Neuro: Denies tinnitus, headache, visual changes, weakness, dizziness or vertigo  Musculoskeletal: Denies neck pain back pain joint pain.  Skin: Denies rash, itching, dryness.  Endocrine: Denies polydipsia, polyuria  Psychiatric: Denies depression, anxiety      PHYSICAL EXAM:  Vital Signs Last 24 Hrs  T(C): 36.5, Max: 36.7 (06-17 @ 08:43)  T(F): 97.7, Max: 98 (06-17 @ 08:43)  HR: 62 (55 - 79)  BP: 112/63 (102/43 - 139/65)  BP(mean): --  RR: 18 (18 - 19)  SpO2: 98% (95% - 98%)  Daily     Daily   I&O's Summary  I & Os for 24h ending 17 Jun 2017 07:00  =============================================  IN: 840 ml / OUT: 2105 ml / NET: -1265 ml    I & Os for current day (as of 18 Jun 2017 05:58)  =============================================  IN: 780 ml / OUT: 1450 ml / NET: -670 ml      General Appearance: 	 Alert, cooperative, no distress  HEENT: normocephalic, atraumatic, PERRLA, EOMI, conjunctiva normal, sclera anicteric,   Neck: no JVD,  carotid 2+  bilaterally without bruits, thyroid normal to inspection and palpation, no adenopathy, trachea midline  Lungs:  clear to auscultation and percussion bilaterally,no ralse  Cor:  pmi 5th ICS MCL, regular rate and rhythm,occ.ectopics, S1 normal intensity, S2 normal intensity, no gallops, murmurs or rubs  Abdomen:	 soft, non-tender; bowel sounds normal; no masses,  no organomegaly  Extremities: without cyanosis, clubbing or edema  Vasc: 2-+ PT and DP pulses; no varicosities  Neurologic: A&O x 3 (time, place, person). Symmetric strength; limited exam  Musculoskeletal: no kyphosis, scoliosis; normal gait, normal tone  Skin: no rashes; limited exam    Telemetry:NSR,PAC's.    Labs:  CBC Full  -  ( 17 Jun 2017 08:27 )  WBC Count : 12.24 K/uL  Hemoglobin : 10.5 g/dL  Hematocrit : 31.6 %  Platelet Count - Automated : 277 K/uL  Mean Cell Volume : 95.8 fl  Mean Cell Hemoglobin : 31.8 pg  Mean Cell Hemoglobin Concentration : 33.2 gm/dL  Auto Neutrophil # : x  Auto Lymphocyte # : x  Auto Monocyte # : x  Auto Eosinophil # : x  Auto Basophil # : x  Auto Neutrophil % : x  Auto Lymphocyte % : x  Auto Monocyte % : x  Auto Eosinophil % : x  Auto Basophil % : x      CARDIAC MARKERS ( 16 Jun 2017 10:33 )  x     / x     / 49 U/L / x     / x      CARDIAC MARKERS ( 16 Jun 2017 10:31 )  x     / <0.01 ng/mL / x     / x     / 2.3 ng/mL      Impression/Plan:Patient with PAF,good LV FX,diastolic dysFX.,DM doing well,improved.No furthur atrial Fib,no acute CHF.Contiunue Dilt. for HR and BP control.Continue Eliquis for anticoagulation.OOB,ambulation,D/C planning.        Cisco Arce MD, [facility]  [facility]

## 2017-06-18 NOTE — PROGRESS NOTE ADULT - SUBJECTIVE AND OBJECTIVE BOX
SUBJECTIVE / OVERNIGHT EVENTS: No nausea, vomiting or diarrhea, no fever or chills, no dizziness or chest pain, no dysuria or hematuria ., tele, nsr, lowest  hr 50    Vital Signs Last 24 Hrs  T(C): 36.5, Max: 36.7 (06-17 @ 13:16)  HR: 67 (58 - 79)  BP: 152/71 (102/43 - 152/71)  RR: 18 (18 - 19)  SpO2: 97% (95% - 98%)  Wt(kg): --  CAPILLARY BLOOD GLUCOSE  110 (18 Jun 2017 07:23)  94 (17 Jun 2017 20:59)  117 (17 Jun 2017 16:06)  121 (17 Jun 2017 11:43)    I&O's Summary  I & Os for 24h ending 18 Jun 2017 07:00  =============================================  IN: 780 ml / OUT: 1750 ml / NET: -970 ml    I & Os for current day (as of 18 Jun 2017 10:45)  =============================================  IN: 280 ml / OUT: 0 ml / NET: 280 ml      PHYSICAL EXAM:  HEAD:  Atraumatic, Normocephalic  EYES: EOMI, PERRLA, conjunctiva and sclera clear  NECK: Supple, No JVD  CHEST/LUNG: Clear to auscultation bilaterally; No wheeze  HEART: Regular rate and rhythm; No murmurs, rubs, or gallops  ABDOMEN: Soft, Nontender, Nondistended; Bowel sounds present  EXTREMITIES:  2+ Peripheral Pulses, No clubbing, cyanosis, or edema  PSYCH: AAOx3  NEUROLOGY: non-focal  SKIN: No rashes or lesions    MEDICATIONS  (STANDING):  insulin lispro (HumaLOG) corrective regimen sliding scale  SubCutaneous three times a day before meals  dextrose 5%. 1000milliLiter(s) IV Continuous <Continuous>  dextrose 50% Injectable 12.5Gram(s) IV Push once  dextrose 50% Injectable 25Gram(s) IV Push once  dextrose 50% Injectable 25Gram(s) IV Push once  predniSONE   Tablet 10milliGRAM(s) Oral daily  atorvastatin 20milliGRAM(s) Oral at bedtime  fenofibrate Tablet 145milliGRAM(s) Oral daily  diltiazem   CD 120milliGRAM(s) Oral daily  apixaban 2.5milliGRAM(s) Oral two times a day    MEDICATIONS  (PRN):  dextrose Gel 1Dose(s) Oral once PRN Blood Glucose LESS THAN 70 milliGRAM(s)/deciliter  glucagon  Injectable 1milliGRAM(s) IntraMuscular once PRN Glucose LESS THAN 70 milligrams/deciliter      LABS:                        10.4   12.00 )-----------( 285      ( 18 Jun 2017 08:28 )             32.0     06-18    135  |  96  |  40<H>  ----------------------------<  113<H>  4.3   |  23  |  2.29<H>    Ca    9.6      18 Jun 2017 08:33                    Hemoglobin A1C, Whole Blood: 6.4 % (06-16 @ 14:02)    Thyroid Stimulating Hormone, Serum: 2.65 uIU/mL (06-16 @ 11:57)      Cultures:    EKG:    Radiological Studies:    Consultant(s) Notes Reviewed:      Care Discussed with Consultants/Other Providers:

## 2017-06-18 NOTE — CONSULT NOTE ADULT - ATTENDING COMMENTS
Avelina Steele M.D. ,   Pager [phone]     after 5PM/ weekends [phone]
I have seen this patient with the fellow and agree with their assessment and plan. CKD stable  Monitor crt in house for any fluctuations based on afib and bp

## 2017-06-18 NOTE — CONSULT NOTE ADULT - ASSESSMENT
Pt is a 89 m ex alcoholic, ex smoker, pre-DM, htn, hld, ckd, copd/emphysema, h/o lung CA s/p LLL wedge resection sent from PMD office with new onset AF, currently all symptoms resolved per Pt- doing better- No fevers. Pt had elevated WC on admission- decreasing without any Abx .Likely reactive   -Recommend to monitor off Abx  -Will d/w ID attending Pt is a 89 m ex alcoholic, ex smoker, pre-DM, htn, hld, ckd, copd/emphysema, h/o lung CA s/p LLL wedge resection sent from PMD office with new onset AF, currently all symptoms resolved per Pt- doing better- No fevers. Pt had elevated WC on admission- decreasing without any Abx .Likely reactive   -Recommend to monitor off Abx  WBC may be in part from steroids.  No localizing sxs  Agree with monitoring off antibioitcs

## 2017-06-18 NOTE — CONSULT NOTE ADULT - SUBJECTIVE AND OBJECTIVE BOX
HPI:  Pt is a 89 m ex alcoholic, ex smoker, pre-DM, htn, hld, ckd, copd/emphysema, h/o lung CA s/p LLL wedge resection sent from PMD office with new onset AF.  Pt had been feeling more ROWELL x 1 week with mild swelling of LEs.  Noted elevated BP monday night to 192/79. ROWELL persisted and pt noted a morning cough that cleared upon getting up.      Currently Pt notes no more SOB, no cough. LE edema resolved- Pt walking around the floor. No complaints currently. Pt with pAF- on AC- being foll by cardiology.         PAST MEDICAL & SURGICAL HISTORY:  Chronic kidney disease, unspecified stage  Chronic obstructive pulmonary disease, unspecified COPD type  HLD (hyperlipidemia)  HTN (hypertension)  S/P lobectomy of lung  No significant past surgical history      Allergies  penicillin (Rash)        ANTIMICROBIALS:  none    OTHER MEDS: MEDICATIONS  (STANDING):  insulin lispro (HumaLOG) corrective regimen sliding scale  three times a day before meals  dextrose Gel 1 once PRN  dextrose 50% Injectable 12.5 once  dextrose 50% Injectable 25 once  dextrose 50% Injectable 25 once  glucagon  Injectable 1 once PRN  predniSONE   Tablet 10 daily  atorvastatin 20 at bedtime  fenofibrate Tablet 145 daily  diltiazem    daily  apixaban 2.5 two times a day      SOCIAL HISTORY:  [ ] etoh [ ] tobacco [ ] former smoker [ ] IVDU- none    FAMILY HISTORY:  No pertinent family history in first degree relatives      REVIEW OF SYSTEMS  [  ] ROS unobtainable because:    [ x ] All other systems negative except as noted below:	all resolved    Constitutional:  [ ] fever [ ] weight loss  Skin:  [ ] rash [ ] phlebitis	  Eyes: [ ] icterus [ ] inflammation	  ENMT: [ ] discharge [ ] thrush [ ] ulcers [ ] exudates  Respiratory: [x ] dyspnea [ ] hemoptysis [x ] cough [ ] sputum	  Cardiovascular:  [ ] chest pain [ ] palpitations [x ] edema	  Gastrointestinal:  [ ] nausea [ ] vomiting [ ] diarrhea [ ] constipation [ ] pain	  Genitourinary:  [ ] dysuria [ ] frequency [ ] hematuria [ ] discharge [ ] flank pain  Musculoskeletal:  [ ] myalgias [ ] arthralgias [ ] arthritis	  Neurological:  [ ] headache [ ] seizures	  Psychiatric:  [ ] anxiety [ ] depression	  Hematology/Lymphatics:  [ ] lymphadenopathy  Endocrine:  [ ] adrenal [ ] thyroid  Allergic/Immunologic:	 [ ] transplant [ ] seasonal    Vital Signs Last 24 Hrs  T(F): 97.7, Max: 98.2 (06-16 @ 04:25)  Wt(kg): --    Vital Signs Last 24 Hrs  HR: 75 (58 - 79)  BP: 131/72 (102/43 - 152/71)  RR: 18  SpO2: 95% (95% - 98%)  Wt(kg): --    PHYSICAL EXAM:  General: Lying in bed, comfortable, NAD  HEAD/EYES: anicteric, PERRL  ENT:  supple  Cardiovascular:   S1, S2  Respiratory:  clear bilaterally  GI:  soft, non-tender, normal bowel sounds  :  no CVA tenderness   Musculoskeletal:  no synovitis  Neurologic:  grossly non-focal  Skin:  no rash  Lymph: no lymphadenopathy  Psychiatric:  appropriate affect  Vascular:  no edema                              10.4   12.00 )-----------( 285      ( 18 Jun 2017 08:28 )             32.0       06-18    135  |  96  |  40<H>  ----------------------------<  113<H>  4.3   |  23  |  2.29<H>    Ca    9.6      18 Jun 2017 08:33            MICROBIOLOGY:  .Urine Clean Catch (Midstream)  06-16-17   <10,000 CFU/ml Normal Urogenital josemanuel present  --  --              RADIOLOGY:  EXAM:  CHEST SINGLE AP OR PA                            PROCEDURE DATE:  06/15/2017        IMPRESSION:   Clear lungs.
Patient is a 89y old  male who presents with a chief complaint of Noted to be in AFib at PMD office, reffered to come to ED for evaluation (15 Denis 2017 21:38)      HPI:  Patient is a 89y male with history of essential hypertension, coronary calcification and COPD/lung cancer admitted with new onset atrial fibrillation. Patient was seen in PMD office due to c/o SOB and found in AFIB. He has been c/o increasing ROWELL with LE swelling. Denies chest pressure.     PAST MEDICAL & SURGICAL HISTORY:  Chronic kidney disease, unspecified stage  Chronic obstructive pulmonary disease, unspecified COPD type  HLD (hyperlipidemia)  HTN (hypertension)  S/P lobectomy of lung  No significant past surgical history    Allergies: penicillin (Rash)    MEDICATIONS  (STANDING):  aspirin  chewable 81milliGRAM(s) Oral daily  heparin  Injectable 5000Unit(s) SubCutaneous every 12 hours  insulin lispro (HumaLOG) corrective regimen sliding scale  SubCutaneous three times a day before meals  dextrose 5%. 1000milliLiter(s) IV Continuous <Continuous>  dextrose 50% Injectable 12.5Gram(s) IV Push once  dextrose 50% Injectable 25Gram(s) IV Push once  dextrose 50% Injectable 25Gram(s) IV Push once  predniSONE   Tablet 10milliGRAM(s) Oral daily  atorvastatin 20milliGRAM(s) Oral at bedtime  fenofibrate Tablet 145milliGRAM(s) Oral daily  diltiazem   CD 120milliGRAM(s) Oral daily    MEDICATIONS  (PRN):  dextrose Gel 1Dose(s) Oral once PRN Blood Glucose LESS THAN 70 milliGRAM(s)/deciliter  glucagon  Injectable 1milliGRAM(s) IntraMuscular once PRN Glucose LESS THAN 70 milligrams/deciliter    FAMILY HISTORY:  No pertinent family history in first degree relatives      ROS:  General: Denies weight loss, fevers, rash, decreased hearing  Cardiac: Denies chest pain,orthopnea, PND, claudication,  snoring, daytime somnolence, palpitations, syncope  Resp: Denies  wheezing, hemoptysis  GI: Denies change in bowel habits, diarrhea, weight loss, melena, tarry stools, nausea, vomiting, jaundice, abdominal pain, dysphagia  : Denies dysuria, nocturia, hematuria  Neuro: Denies tinnitus, headache, visual changes, weakness, dizziness or vertigo  Musculoskeletal: Denies neck pain back pain joint pain.  Skin: Denies rash, itching, dryness.  Endocrine: Denies polydipsia, polyuria  Psychiatric: Denies depression, anxiety  All other review of systems is negative unless indicated above.    PHYSICAL EXAM:  Vital Signs Last 24 Hrs  T(C): 36.8, Max: 36.8 (06-16 @ 04:25)  T(F): 98.2, Max: 98.2 (06-16 @ 04:25)  HR: 71 (63 - 82)  BP: 159/68 (135/93 - 183/48)  BP(mean): --  RR: 18 (16 - 18)  SpO2: 95% (94% - 100%)  I&O's Summary    I & Os for current day (as of 16 Jun 2017 07:02)  =============================================  IN: 340 ml / OUT: 200 ml / NET: 140 ml      General Appearance: 	 Alert, cooperative, no distress; elderly  HEENT: normocephalic, atraumatic  Neck: no JVD,  carotid 2+  bilaterally without bruits  Lungs:  basilar crackles left 1/3  Cor:  pmi 5th ICS MCL, irregular rate and rhythm, S1 normal intensity, S2 normal intensity, no gallops, murmurs or rubs  Abdomen: soft, non-tender; bowel sounds normal; no masses,  no organomegaly  Extremities: without cyanosis, clubbing; trace LE edema  Vasc: 2-+ PT and DP pulses; no varicosities    EKG: results NA    LABS:                        11.6   15.1  )-----------( 307      ( 15 Denis 2017 15:36 )             37.5     06-15    140  |  101  |  32<H>  ----------------------------<  104<H>  4.0   |  26  |  2.41<H>    Ca    10.7<H>      15 Denis 2017 15:36    TPro  7.4  /  Alb  4.1  /  TBili  0.4  /  DBili  x   /  AST  20  /  ALT  19  /  AlkPhos  45  06-15      Ketone - Urine: Negative (06-15 @ 22:01)    CAPILLARY BLOOD GLUCOSE  128 (15 Denis 2017 22:37)    PT/INR - ( 15 Denis 2017 15:36 )   PT: 10.4 sec;   INR: 0.96 ratio         PTT - ( 15 Denis 2017 15:36 )  PTT:28.2 sec    Radiology/Imaging:  CT chest  IMPRESSION:   1.  Emphysema.  2.  Left lower lobe wedge resection.  3.  No change in the reticular opacities and honeycombing since 4/20/2016.  4.  No change in the 1 mm left lower lobe nodule.550
[facility] DIVISION OF KIDNEY DISEASES AND HYPERTENSION -- INITIAL CONSULT NOTE  --------------------------------------------------------------------------------  HPI: This is a 89 y/o M PMH HTN, CAD, COPD/lung cancer s/p lobe resection, diet-controlled DM2, CKD3, p/w ROWELL found to be in afib at PMD office and sent to hospital. Pt. saw Dr. hoover, nephrology, in 2015 last. Pt. States he has had a problem with his kidneys for "a long time now".Pt. states he was told it was due to his high blood pressure. As per allscripts, pt. last Scr checked in 12/2016 was 2.3. Pt. currently denies SOB/CP/N/V diarrhea/ fever/chills.       PAST HISTORY  --------------------------------------------------------------------------------  PAST MEDICAL & SURGICAL HISTORY:  Chronic kidney disease, unspecified stage  Chronic obstructive pulmonary disease, unspecified COPD type  HLD (hyperlipidemia)  HTN (hypertension)  S/P lobectomy of lung      FAMILY HISTORY:  No pertinent family history in first degree relatives    PAST SOCIAL HISTORY: previous hx of tobacco use, 10years ago. hx of alcohol use. no hx of illicit drug use.     ALLERGIES & MEDICATIONS  --------------------------------------------------------------------------------  Allergies    penicillin (Rash)    Intolerances      Standing Inpatient Medications  insulin lispro (HumaLOG) corrective regimen sliding scale  SubCutaneous three times a day before meals  dextrose 5%. 1000milliLiter(s) IV Continuous <Continuous>  dextrose 50% Injectable 12.5Gram(s) IV Push once  dextrose 50% Injectable 25Gram(s) IV Push once  dextrose 50% Injectable 25Gram(s) IV Push once  predniSONE   Tablet 10milliGRAM(s) Oral daily  atorvastatin 20milliGRAM(s) Oral at bedtime  fenofibrate Tablet 145milliGRAM(s) Oral daily  diltiazem   CD 120milliGRAM(s) Oral daily  apixaban 2.5milliGRAM(s) Oral two times a day    PRN Inpatient Medications  dextrose Gel 1Dose(s) Oral once PRN  glucagon  Injectable 1milliGRAM(s) IntraMuscular once PRN      REVIEW OF SYSTEMS  --------------------------------------------------------------------------------  No SOB/CP/N/V diarrhea fever/chills.     VITALS/PHYSICAL EXAM  --------------------------------------------------------------------------------  T(C): 36.8, Max: 36.8 (06-16 @ 04:25)  HR: 63 (63 - 82)  BP: 159/68 (135/93 - 183/48)  RR: 18 (16 - 18)  SpO2: 95% (94% - 100%)  Wt(kg): --  Height (cm): 172.7 (06-15-17 @ 22:54)  Weight (kg): 86.3 (06-15-17 @ 22:54)  BMI (kg/m2): 28.9 (06-15-17 @ 22:54)  BSA (m2): 2 (06-15-17 @ 22:54)      I & Os for current day (as of 06-16-17 @ 09:38)  =============================================  IN: 340 ml / OUT: 200 ml / NET: 140 ml    Physical Exam:  	Gen: NAD,   	HEENT: PERRL,   	Pulm: CTA B/L  	CV: RRR  	Abd: +BS, soft,   	LE: Warm, no edema  	Skin: Warm,     LABS/STUDIES  --------------------------------------------------------------------------------              11.6   15.1  >-----------<  307      [06-15-17 @ 15:36]              37.5     140  |  101  |  32  ----------------------------<  104      [06-15-17 @ 15:36]  4.0   |  26  |  2.41        Ca     10.7     [06-15-17 @ 15:36]    TPro  7.4  /  Alb  4.1  /  TBili  0.4  /  DBili  x   /  AST  20  /  ALT  19  /  AlkPhos  45  [06-15-17 @ 15:36]    PT/INR: PT 10.4 , INR 0.96       [06-15-17 @ 15:36]  PTT: 28.2       [06-15-17 @ 15:36]    Troponin <0.01      [06-15-17 @ 23:58]  CK 58      [06-15-17 @ 23:58]    Creatinine Trend:  SCr 2.41 [06-15 @ 15:36]    Urinalysis - [06-15-17 @ 22:01]      Color Yellow / Appearance Clear / SG 1.019 / pH 5.5      Gluc Negative / Ketone Negative  / Bili Negative / Urobili Negative       Blood Negative / Protein 30 / Leuk Est Negative / Nitrite Negative      RBC 0-2 / WBC 3-5 / Hyaline  / Gran  / Sq Epi  / Non Sq Epi  / Bacteria Few

## 2017-06-18 NOTE — PROGRESS NOTE ADULT - ASSESSMENT
pt  in  nsr, PAF, ON  ELIQUIS,  MILDLY  HIGH WBC,  PT  AFEBRILE,  DENIES  ANY  CP/ SOB/  ABD PAIN. DYSURIA/  /  APPEARS  NON TOXIC,  HOUSE  ID CALLED, NO  AB GIVEN, IF  CLEARED  BY  ID, THEN  WOULD  CONSIDER  DC

## 2017-06-19 VITALS
TEMPERATURE: 97 F | DIASTOLIC BLOOD PRESSURE: 68 MMHG | HEART RATE: 81 BPM | RESPIRATION RATE: 18 BRPM | OXYGEN SATURATION: 94 % | SYSTOLIC BLOOD PRESSURE: 148 MMHG

## 2017-06-19 DIAGNOSIS — I48.0 PAROXYSMAL ATRIAL FIBRILLATION: ICD-10-CM

## 2017-06-19 PROCEDURE — 85014 HEMATOCRIT: CPT

## 2017-06-19 PROCEDURE — 82553 CREATINE MB FRACTION: CPT

## 2017-06-19 PROCEDURE — 85027 COMPLETE CBC AUTOMATED: CPT

## 2017-06-19 PROCEDURE — 84132 ASSAY OF SERUM POTASSIUM: CPT

## 2017-06-19 PROCEDURE — 83605 ASSAY OF LACTIC ACID: CPT

## 2017-06-19 PROCEDURE — 83735 ASSAY OF MAGNESIUM: CPT

## 2017-06-19 PROCEDURE — 80053 COMPREHEN METABOLIC PANEL: CPT

## 2017-06-19 PROCEDURE — 82803 BLOOD GASES ANY COMBINATION: CPT

## 2017-06-19 PROCEDURE — 71045 X-RAY EXAM CHEST 1 VIEW: CPT

## 2017-06-19 PROCEDURE — 80048 BASIC METABOLIC PNL TOTAL CA: CPT

## 2017-06-19 PROCEDURE — 93005 ELECTROCARDIOGRAM TRACING: CPT

## 2017-06-19 PROCEDURE — 99285 EMERGENCY DEPT VISIT HI MDM: CPT | Mod: 25

## 2017-06-19 PROCEDURE — 85610 PROTHROMBIN TIME: CPT

## 2017-06-19 PROCEDURE — 83036 HEMOGLOBIN GLYCOSYLATED A1C: CPT

## 2017-06-19 PROCEDURE — 84484 ASSAY OF TROPONIN QUANT: CPT

## 2017-06-19 PROCEDURE — 83880 ASSAY OF NATRIURETIC PEPTIDE: CPT

## 2017-06-19 PROCEDURE — 81001 URINALYSIS AUTO W/SCOPE: CPT

## 2017-06-19 PROCEDURE — 84100 ASSAY OF PHOSPHORUS: CPT

## 2017-06-19 PROCEDURE — 82330 ASSAY OF CALCIUM: CPT

## 2017-06-19 PROCEDURE — 87086 URINE CULTURE/COLONY COUNT: CPT

## 2017-06-19 PROCEDURE — 85730 THROMBOPLASTIN TIME PARTIAL: CPT

## 2017-06-19 PROCEDURE — 82550 ASSAY OF CK (CPK): CPT

## 2017-06-19 PROCEDURE — 84443 ASSAY THYROID STIM HORMONE: CPT

## 2017-06-19 PROCEDURE — 84295 ASSAY OF SERUM SODIUM: CPT

## 2017-06-19 PROCEDURE — 82947 ASSAY GLUCOSE BLOOD QUANT: CPT

## 2017-06-19 PROCEDURE — 93306 TTE W/DOPPLER COMPLETE: CPT

## 2017-06-19 PROCEDURE — 82435 ASSAY OF BLOOD CHLORIDE: CPT

## 2017-06-19 PROCEDURE — 97161 PT EVAL LOW COMPLEX 20 MIN: CPT

## 2017-06-19 RX ORDER — APIXABAN 2.5 MG/1
1 TABLET, FILM COATED ORAL
Qty: 0 | Refills: 0 | COMMUNITY
Start: 2017-06-19

## 2017-06-19 RX ORDER — VALSARTAN 80 MG/1
1 TABLET ORAL
Qty: 0 | Refills: 0 | COMMUNITY

## 2017-06-19 RX ORDER — APIXABAN 2.5 MG/1
1 TABLET, FILM COATED ORAL
Qty: 60 | Refills: 0 | OUTPATIENT
Start: 2017-06-19 | End: 2017-07-19

## 2017-06-19 RX ADMIN — Medication 120 MILLIGRAM(S): at 05:30

## 2017-06-19 RX ADMIN — Medication 10 MILLIGRAM(S): at 05:30

## 2017-06-19 RX ADMIN — APIXABAN 2.5 MILLIGRAM(S): 2.5 TABLET, FILM COATED ORAL at 05:30

## 2017-06-19 RX ADMIN — Medication 145 MILLIGRAM(S): at 11:18

## 2017-06-19 NOTE — DISCHARGE NOTE ADULT - REASON FOR ADMISSION
Noted to be in AFib at PMD office, reffered to come to ED for evaluation Noted to be in AFib at PMD office, referred to come to ED for evaluation

## 2017-06-19 NOTE — DISCHARGE NOTE ADULT - NS AS ACTIVITY OBS
Walking-Outdoors allowed/Walking-Indoors allowed/Stairs allowed/Driving allowed/No Heavy lifting/straining

## 2017-06-19 NOTE — DISCHARGE NOTE ADULT - PATIENT PORTAL LINK FT
“You can access the Follow[employer] Patient Portal, offered by [facility], by registering with the following website: [email or web address]”

## 2017-06-19 NOTE — DISCHARGE NOTE ADULT - CARE PROVIDER_API CALL
John Watts), Cardiovascular Disease; Internal Medicine; Nuclear Cardiology  [address]  [place], [place] [address]  Phone: [phone]  Fax: [phone]    Brandon Anguiano), Internal Medicine  [address] [place] [address]  Phone: [phone]  Fax: [phone] John Watts), Cardiovascular Disease; Internal Medicine; Nuclear Cardiology  [address]  [place], [place] [address]  Phone: [phone]  Fax: [phone]    Brandon Anguiano), Internal Medicine  [address] [place] [address]  Phone: [phone]  Fax: [phone]    Deysi Sullivan), Internal Medicine; Nephrology  [address] [place] [address]  Phone: [phone]  Fax: [phone]

## 2017-06-19 NOTE — DISCHARGE NOTE ADULT - CARE PROVIDERS DIRECT ADDRESSES
,DirectAddress_Unknown,[email or web address] ,DirectAddress_[email or web address]

## 2017-06-19 NOTE — PHYSICAL THERAPY INITIAL EVALUATION ADULT - ADDITIONAL COMMENTS
Pt lives at the [facility], no steps to enter, +elevator access. Pt ambulates with straight cane independently and has RW and shopping cart.

## 2017-06-19 NOTE — PHYSICAL THERAPY INITIAL EVALUATION ADULT - GENERAL OBSERVATIONS, REHAB EVAL
Pt received semi-supine in bed in NAD, +tele monitor, +IV lock, +supplemental O2 via NC, in NAD, agreeable to PT initial evaluation

## 2017-06-19 NOTE — DISCHARGE NOTE ADULT - MEDICATION SUMMARY - MEDICATIONS TO STOP TAKING
I will STOP taking the medications listed below when I get home from the hospital:    valsartan 80 mg oral tablet  -- 1 tab(s) by mouth once a day    hydroCHLOROthiazide 12.5 mg oral tablet  -- 1 tab(s) by mouth once a day

## 2017-06-19 NOTE — DISCHARGE NOTE ADULT - CARE PLAN
Principal Discharge DX:	Afib  Goal:	Afib will be rate controlled with medication  Instructions for follow-up, activity and diet:	Atrial fibrillation is the most common heart rhythm problem & has the risk of stroke & heart attack  It helps if you control your blood pressure, not drink more than 1-2 alcohol drinks per day, cut down on caffeine, getting treatment for over active thyroid gland, & getting exercise  Call your doctor if you feel your heart racing or beating unusually, chest tightness or pain, lightheaded, faint, shortness of breath especially with exercise  It is important to take your heart medication as prescribed  You may be on anticoagulation which is very important to take as directed - you may need blood work to monitor drug levels  Secondary Diagnosis:	Chronic kidney disease, unspecified stage  Instructions for follow-up, activity and diet:	Avoid taking (NSAIDs) - (ex: Ibuprofen, Advil, Celebrex, Naprosyn)  Avoid taking any nephrotoxic agents (can harm kidneys) - Intravenous contrast for diagnostic testing, combination cold medications.  Have all medications adjusted for your renal function by your Health Care Provider.  Blood pressure control is important.  Take all medication as prescribed.  Secondary Diagnosis:	Essential hypertension  Instructions for follow-up, activity and diet:	Low salt diet  Activity as tolerated.  Take all medication as prescribed.  Follow up with your medical doctor for routine blood pressure monitoring at your next visit.  Notify your doctor if you have any of the following symptoms:   Dizziness, Lightheadedness, Blurry vision, Headache, Chest pain, Shortness of breath  Goal:	Eliquis  Instructions for follow-up, activity and diet:	Eliquis  is used in AFib  to thin the blood so clots will not form.  Also it is use to keep new clots from forming and  existing ones from getting bigger.  Take this medication twice as prescribed by your health care provider.  Take this medication with food to prevent upset stomach.  If you miss a dose call your health care provider or pharmacist right away.  Tell your doctor you use this drug before you have a spinal or epidural procedure  Tell dentists, surgeon, and other doctors that you use this drug.  You may bleed more easily.  Be careful and avoid injury.  Use a soft toothbrush and an electric razor.

## 2017-06-19 NOTE — DISCHARGE NOTE ADULT - PROVIDER TOKENS
TOKEN:'[id number]:'[id number]' TOKEN:'[id number]:'[id number]',TOKEN:'[id number]'

## 2017-06-19 NOTE — DISCHARGE NOTE ADULT - ADDITIONAL INSTRUCTIONS
Please schedule an appointment with your PMD within one week  Please Schedule an appointment with Dr. Watts within one week for follow upcare Please schedule an appointment with your PMD within one week  Please Schedule an appointment with Dr. Watts within one week for follow up care  Please schedule an appointment with your Kidney doctor within one  week for follow visit

## 2017-06-19 NOTE — DISCHARGE NOTE ADULT - PLAN OF CARE
Low salt diet  Activity as tolerated.  Take all medication as prescribed.  Follow up with your medical doctor for routine blood pressure monitoring at your next visit.  Notify your doctor if you have any of the following symptoms:   Dizziness, Lightheadedness, Blurry vision, Headache, Chest pain, Shortness of breath Avoid taking (NSAIDs) - (ex: Ibuprofen, Advil, Celebrex, Naprosyn)  Avoid taking any nephrotoxic agents (can harm kidneys) - Intravenous contrast for diagnostic testing, combination cold medications.  Have all medications adjusted for your renal function by your Health Care Provider.  Blood pressure control is important.  Take all medication as prescribed. Atrial fibrillation is the most common heart rhythm problem & has the risk of stroke & heart attack  It helps if you control your blood pressure, not drink more than 1-2 alcohol drinks per day, cut down on caffeine, getting treatment for over active thyroid gland, & getting exercise  Call your doctor if you feel your heart racing or beating unusually, chest tightness or pain, lightheaded, faint, shortness of breath especially with exercise  It is important to take your heart medication as prescribed  You may be on anticoagulation which is very important to take as directed - you may need blood work to monitor drug levels Afib will be rate controlled with medication Eliquis  is used in AFib  to thin the blood so clots will not form.  Also it is use to keep new clots from forming and  existing ones from getting bigger.  Take this medication twice as prescribed by your health care provider.  Take this medication with food to prevent upset stomach.  If you miss a dose call your health care provider or pharmacist right away.  Tell your doctor you use this drug before you have a spinal or epidural procedure  Tell dentists, surgeon, and other doctors that you use this drug.  You may bleed more easily.  Be careful and avoid injury.  Use a soft toothbrush and an electric razor. Eliquis

## 2017-06-19 NOTE — PHYSICAL THERAPY INITIAL EVALUATION ADULT - PERTINENT HX OF CURRENT PROBLEM, REHAB EVAL
89y/o M sent from PMD office with new onset AF. Pt has been feeling more ROWELL x 1 week with mild swelling of LEs.  Noted elevated BP monday night to 192/79 taken in AL but Tuesday morning repeat 150/79.  ROWELL persisted and pt noted a cough that cleared upon getting up. Pt started night home o2 following LLL wedge resection though progressed to requiring oxygen for 20 hrs/day after a hospitalization 1.5 yrs ago for PNA when orthopnea requiring a wedge and mild rowell became chronic. CXR (-)

## 2017-06-19 NOTE — PROGRESS NOTE ADULT - SUBJECTIVE AND OBJECTIVE BOX
Patient is a 89y old  Male who presents with a chief complaint of Noted to be in AFib at PMD office, reffered to come to ED for evaluation (15 Denis 2017 21:38)    Patient is feeling well. He denies c/o chest pain, SOB or palpitations Lying  in bed. Not ambulating much.     Allergies: penicillin (Rash)      MEDICATIONS  (STANDING):  insulin lispro (HumaLOG) corrective regimen sliding scale  SubCutaneous three times a day before meals  dextrose 5%. 1000milliLiter(s) IV Continuous <Continuous>  dextrose 50% Injectable 12.5Gram(s) IV Push once  dextrose 50% Injectable 25Gram(s) IV Push once  dextrose 50% Injectable 25Gram(s) IV Push once  predniSONE   Tablet 10milliGRAM(s) Oral daily  atorvastatin 20milliGRAM(s) Oral at bedtime  fenofibrate Tablet 145milliGRAM(s) Oral daily  diltiazem   CD 120milliGRAM(s) Oral daily  apixaban 2.5milliGRAM(s) Oral two times a day    MEDICATIONS  (PRN):  dextrose Gel 1Dose(s) Oral once PRN Blood Glucose LESS THAN 70 milliGRAM(s)/deciliter  glucagon  Injectable 1milliGRAM(s) IntraMuscular once PRN Glucose LESS THAN 70 milligrams/deciliter      PHYSICAL EXAM:  Vital Signs Last 24 Hrs  T(C): 36.8, Max: 37.2 (06-18 @ 20:28)  T(F): 98.2, Max: 98.9 (06-18 @ 20:28)  HR: 60 (60 - 75)  BP: 138/68 (109/60 - 153/69)  BP(mean): --  RR: 18 (18 - 19)  SpO2: 99% (95% - 99%)  Daily     Daily   I&O's Summary  I & Os for 24h ending 18 Jun 2017 07:00  =============================================  IN: 780 ml / OUT: 1750 ml / NET: -970 ml    I & Os for current day (as of 19 Jun 2017 06:56)  =============================================  IN: 770 ml / OUT: 1750 ml / NET: -980 ml    General Appearance: 	 Alert, cooperative, no distress; elderly  HEENT: normocephalic, atraumatic  Neck: no JVD,  carotid 2+  bilaterally without bruits  Lungs:  basilar crackles left  Cor:  pmi 5th ICS MCL, regular rate and rhythm, S1 normal intensity, S2 normal intensity, no gallops, murmurs or rubs  Abdomen: soft, non-tender; bowel sounds normal; no masses,  no organomegaly  Extremities: without cyanosis, clubbing;  edema  Vasc: 2-+ PT and DP pulses; no varicosities  liosis; normal gait, normal tone  Skin: no rashes; limited exam    Telemetry: NSR 70/min    Labs:  CBC Full  -  ( 18 Jun 2017 08:28 )  WBC Count : 12.00 K/uL  Hemoglobin : 10.4 g/dL  Hematocrit : 32.0 %  Platelet Count - Automated : 285 K/uL  Mean Cell Volume : 96.4 fl  Mean Cell Hemoglobin : 31.3 pg  Mean Cell Hemoglobin Concentration : 32.5 gm/dL  Auto Neutrophil # : x  Auto Lymphocyte # : x  Auto Monocyte # : x  Auto Eosinophil # : x  Auto Basophil # : x  Auto Neutrophil % : x  Auto Lymphocyte % : x  Auto Monocyte % : x  Auto Eosinophil % : x  Auto Basophil % : x    06-18    135  |  96  |  40<H>  ----------------------------<  113<H>  4.3   |  23  |  2.29<H>    Echocardiogram  Conclusions:  1. Increased relative wall thickness with normal left  ventricular mass index, consistent with concentric left  ventricular remodeling.  2. Normal left ventricular systolic function. No segmental  wall motion abnormalities.  3. Reversal of the E-A  waves of the mitral inflow pattern  is consistent with diastolic LV dysfunction.  4. Normal right ventricular size and function.                    John Watts MD [facility]  [phone]

## 2017-06-19 NOTE — DISCHARGE NOTE ADULT - MEDICATION SUMMARY - MEDICATIONS TO TAKE
I will START or STAY ON the medications listed below when I get home from the hospital:    predniSONE 10 mg oral tablet  -- 1 tab(s) by mouth once a day  -- Indication: For Steriod     dilTIAZem 120 mg/24 hours oral capsule, extended release  -- 1 cap(s) by mouth once a day  -- Indication: For blood pressure     apixaban 2.5 mg oral tablet  -- 1 tab(s) by mouth 2 times a day  -- Indication: For blood thinner for Afib     atorvastatin 20 mg oral tablet  -- 1 tab(s) by mouth once a day  -- Indication: For Elevated cholesterol     fenofibrate 145 mg oral tablet  -- 1 tab(s) by mouth once a day  -- Indication: For Elevated cholesterol

## 2017-06-19 NOTE — PROGRESS NOTE ADULT - PROVIDER SPECIALTY LIST ADULT
Cardiology
Internal Medicine

## 2017-06-19 NOTE — PROGRESS NOTE ADULT - SUBJECTIVE AND OBJECTIVE BOX
Since hospitalized pt has been in SR. No SOB, edema.  Maintained on Eliquis low dosae w/o bleeding.  No c/p    REVIEW OF SYSTEMS    	    Respiratory and Thorax:  No SOB  	  Cardiovascular:	No c/p, dizziness, palpitations.    Musculoskeletal: No edema	    	  Vital Signs Last 24 Hrs  T(C): 36.8, Max: 37.2 (06-18 @ 20:28)  T(F): 98.2, Max: 98.9 (06-18 @ 20:28)  HR: 60 (60 - 75)  BP: 138/68 (109/60 - 153/69)  BP(mean): --  RR: 18 (18 - 19)  SpO2: 99% (95% - 99%)  Daily     Daily     I & Os for current day (as of 06-19 @ 07:07)  =============================================  IN: 770 ml / OUT: 1750 ml / NET: -980 ml      PHYSICAL EXAM:    Neck:  No increased JVP    Respiratory:  Lungs clear    Cardiovascular:  RRR, nl S1S2, no murmur    Gastrointestinal:  Soft, NT    Neurological:  No global or focal defici:                                10.4   12.00 )-----------( 285      ( 18 Jun 2017 08:28 )             32.0     06-18    135  |  96  |  40<H>  ----------------------------<  113<H>  4.3   |  23  |  2.29<H>    Ca    9.6      18 Jun 2017 08:33            MEDICATIONS  (STANDING):  insulin lispro (HumaLOG) corrective regimen sliding scale  SubCutaneous three times a day before meals  dextrose 5%. 1000milliLiter(s) IV Continuous <Continuous>  dextrose 50% Injectable 12.5Gram(s) IV Push once  dextrose 50% Injectable 25Gram(s) IV Push once  dextrose 50% Injectable 25Gram(s) IV Push once  predniSONE   Tablet 10milliGRAM(s) Oral daily  atorvastatin 20milliGRAM(s) Oral at bedtime  fenofibrate Tablet 145milliGRAM(s) Oral daily  diltiazem   CD 120milliGRAM(s) Oral daily  apixaban 2.5milliGRAM(s) Oral two times a day    MEDICATIONS  (PRN):  dextrose Gel 1Dose(s) Oral once PRN Blood Glucose LESS THAN 70 milliGRAM(s)/deciliter  glucagon  Injectable 1milliGRAM(s) IntraMuscular once PRN Glucose LESS THAN 70 milligrams/deciliter      Impression:    89 M admitted with new AF, mild CHF.  No further fluid overload,  SR on telemetry since admission.    Plan:    d/w Dr. Watts.  Continue Eliquis and D/C home.  Rate control with Cardizem  Continue all other OP medications.  OP follow up next week in my office.    Brandon Anguiano MD  [phone] Since hospitalized pt has been in SR. No SOB, edema.  Maintained on Eliquis low dosae w/o bleeding.  No c/p    REVIEW OF SYSTEMS    	    Respiratory and Thorax:  No SOB  	  Cardiovascular:	No c/p, dizziness, palpitations.    Musculoskeletal: No edema	    	  Vital Signs Last 24 Hrs  T(C): 36.8, Max: 37.2 (06-18 @ 20:28)  T(F): 98.2, Max: 98.9 (06-18 @ 20:28)  HR: 60 (60 - 75)  BP: 138/68 (109/60 - 153/69)  BP(mean): --  RR: 18 (18 - 19)  SpO2: 99% (95% - 99%)  Daily     Daily     I & Os for current day (as of 06-19 @ 07:07)  =============================================  IN: 770 ml / OUT: 1750 ml / NET: -980 ml      PHYSICAL EXAM:    Neck:  No increased JVP    Respiratory:  Lungs clear    Cardiovascular:  RRR, nl S1S2, no murmur    Gastrointestinal:  Soft, NT    Neurological:  No global or focal defici:                                10.4   12.00 )-----------( 285      ( 18 Jun 2017 08:28 )             32.0     06-18    135  |  96  |  40<H>  ----------------------------<  113<H>  4.3   |  23  |  2.29<H>    Ca    9.6      18 Jun 2017 08:33    Conclusions:  1. Increased relative wall thickness with normal left  ventricular mass index, consistent with concentric left  ventricular remodeling.  2. Normal left ventricular systolic function. No segmental  wall motion abnormalities.  3. Reversal of the E-A  waves of the mitral inflow pattern  is consistent with diastolic LV dysfunction.  4. Normal right ventricular size and function.        MEDICATIONS  (STANDING):  insulin lispro (HumaLOG) corrective regimen sliding scale  SubCutaneous three times a day before meals  dextrose 5%. 1000milliLiter(s) IV Continuous <Continuous>  dextrose 50% Injectable 12.5Gram(s) IV Push once  dextrose 50% Injectable 25Gram(s) IV Push once  dextrose 50% Injectable 25Gram(s) IV Push once  predniSONE   Tablet 10milliGRAM(s) Oral daily  atorvastatin 20milliGRAM(s) Oral at bedtime  fenofibrate Tablet 145milliGRAM(s) Oral daily  diltiazem   CD 120milliGRAM(s) Oral daily  apixaban 2.5milliGRAM(s) Oral two times a day    MEDICATIONS  (PRN):  dextrose Gel 1Dose(s) Oral once PRN Blood Glucose LESS THAN 70 milliGRAM(s)/deciliter  glucagon  Injectable 1milliGRAM(s) IntraMuscular once PRN Glucose LESS THAN 70 milligrams/deciliter      Impression:    89 M admitted with new AF, mild CHF.  No further fluid overload,  SR on telemetry since admission.    Plan:    d/w Dr. Watts.  Continue Eliquis and D/C home.  Rate control with Cardizem  Continue all other OP medications.  OP follow up next week in my office.    Brandon Anguiano MD  [phone]

## 2017-06-19 NOTE — PROGRESS NOTE ADULT - ASSESSMENT
89 year old male with history of essential HTN, CKD and COPD admitted with PAF and COPD exacerbation. He has remained in NSR since admission. Review of outpatient EKG was c/w AFIB. NO CHF and echocardiogram noted with normal LV systolic function.

## 2017-06-28 PROBLEM — E78.5 HYPERLIPIDEMIA, UNSPECIFIED: Chronic | Status: ACTIVE | Noted: 2017-06-15

## 2017-06-28 PROBLEM — I10 ESSENTIAL (PRIMARY) HYPERTENSION: Chronic | Status: ACTIVE | Noted: 2017-06-15

## 2017-07-25 ENCOUNTER — APPOINTMENT (OUTPATIENT)
Dept: NEPHROLOGY | Facility: CLINIC | Age: 82
End: 2017-07-25

## 2017-07-25 VITALS
DIASTOLIC BLOOD PRESSURE: 73 MMHG | HEIGHT: 68 IN | OXYGEN SATURATION: 92 % | BODY MASS INDEX: 29.22 KG/M2 | HEART RATE: 84 BPM | SYSTOLIC BLOOD PRESSURE: 155 MMHG | WEIGHT: 192.8 LBS

## 2017-07-25 RX ORDER — FUROSEMIDE 20 MG/1
20 TABLET ORAL DAILY
Qty: 90 | Refills: 3 | Status: ACTIVE | COMMUNITY
Start: 2017-07-25 | End: 1900-01-01

## 2017-07-25 RX ORDER — ATORVASTATIN CALCIUM 20 MG/1
20 TABLET, FILM COATED ORAL
Refills: 0 | Status: ACTIVE | COMMUNITY

## 2017-07-26 LAB
APPEARANCE: CLEAR
BACTERIA: NEGATIVE
BILIRUBIN URINE: NEGATIVE
BLOOD URINE: NEGATIVE
COLOR: YELLOW
CREAT SPEC-SCNC: 52 MG/DL
GLUCOSE QUALITATIVE U: NORMAL MG/DL
KETONES URINE: NEGATIVE
LEUKOCYTE ESTERASE URINE: NEGATIVE
MICROALBUMIN 24H UR DL<=1MG/L-MCNC: 19.2 MG/DL
MICROALBUMIN/CREAT 24H UR-RTO: 369 MG/G
MICROSCOPIC-UA: NORMAL
NITRITE URINE: NEGATIVE
PH URINE: 5.5
PROTEIN URINE: 30 MG/DL
RED BLOOD CELLS URINE: 0 /HPF
SPECIFIC GRAVITY URINE: 1.01
SQUAMOUS EPITHELIAL CELLS: 0 /HPF
UROBILINOGEN URINE: NORMAL MG/DL
WHITE BLOOD CELLS URINE: 0 /HPF

## 2017-08-02 ENCOUNTER — APPOINTMENT (OUTPATIENT)
Dept: PULMONOLOGY | Facility: CLINIC | Age: 82
End: 2017-08-02
Payer: MEDICARE

## 2017-08-02 VITALS
DIASTOLIC BLOOD PRESSURE: 50 MMHG | SYSTOLIC BLOOD PRESSURE: 140 MMHG | HEIGHT: 68 IN | WEIGHT: 190 LBS | BODY MASS INDEX: 28.79 KG/M2 | HEART RATE: 79 BPM | RESPIRATION RATE: 16 BRPM | OXYGEN SATURATION: 93 %

## 2017-08-02 DIAGNOSIS — R26.89 OTHER ABNORMALITIES OF GAIT AND MOBILITY: ICD-10-CM

## 2017-08-02 DIAGNOSIS — J84.9 INTERSTITIAL PULMONARY DISEASE, UNSPECIFIED: ICD-10-CM

## 2017-08-02 DIAGNOSIS — J44.9 CHRONIC OBSTRUCTIVE PULMONARY DISEASE, UNSPECIFIED: ICD-10-CM

## 2017-08-02 DIAGNOSIS — R93.8 ABNORMAL FINDINGS ON DIAGNOSTIC IMAGING OF OTHER SPECIFIED BODY STRUCTURES: ICD-10-CM

## 2017-08-02 DIAGNOSIS — C34.90 MALIGNANT NEOPLASM OF UNSPECIFIED PART OF UNSPECIFIED BRONCHUS OR LUNG: ICD-10-CM

## 2017-08-02 DIAGNOSIS — G47.33 OBSTRUCTIVE SLEEP APNEA (ADULT) (PEDIATRIC): ICD-10-CM

## 2017-08-02 DIAGNOSIS — R06.02 SHORTNESS OF BREATH: ICD-10-CM

## 2017-08-02 DIAGNOSIS — E66.9 OBESITY, UNSPECIFIED: ICD-10-CM

## 2017-08-02 PROCEDURE — 99214 OFFICE O/P EST MOD 30 MIN: CPT | Mod: 25

## 2017-08-02 PROCEDURE — 94729 DIFFUSING CAPACITY: CPT

## 2017-08-02 PROCEDURE — 94010 BREATHING CAPACITY TEST: CPT

## 2017-08-14 ENCOUNTER — MEDICATION RENEWAL (OUTPATIENT)
Age: 82
End: 2017-08-14

## 2017-08-14 RX ORDER — PREDNISONE 10 MG/1
10 TABLET ORAL
Qty: 100 | Refills: 0 | Status: ACTIVE | COMMUNITY
Start: 2017-05-03 | End: 1900-01-01

## 2017-09-19 ENCOUNTER — APPOINTMENT (OUTPATIENT)
Dept: NEPHROLOGY | Facility: CLINIC | Age: 82
End: 2017-09-19
Payer: MEDICARE

## 2017-09-19 VITALS — SYSTOLIC BLOOD PRESSURE: 126 MMHG | HEART RATE: 70 BPM | HEIGHT: 68 IN | DIASTOLIC BLOOD PRESSURE: 60 MMHG

## 2017-09-19 DIAGNOSIS — N18.4 CHRONIC KIDNEY DISEASE, STAGE 4 (SEVERE): ICD-10-CM

## 2017-09-19 DIAGNOSIS — I10 ESSENTIAL (PRIMARY) HYPERTENSION: ICD-10-CM

## 2017-09-19 DIAGNOSIS — E11.21 TYPE 2 DIABETES MELLITUS WITH DIABETIC NEPHROPATHY: ICD-10-CM

## 2017-09-19 PROCEDURE — 36415 COLL VENOUS BLD VENIPUNCTURE: CPT

## 2017-09-19 PROCEDURE — 99214 OFFICE O/P EST MOD 30 MIN: CPT | Mod: 25

## 2017-09-20 LAB
25(OH)D3 SERPL-MCNC: 28.1 NG/ML
ALBUMIN SERPL ELPH-MCNC: 3.9 G/DL
ALP BLD-CCNC: 46 U/L
ALT SERPL-CCNC: 19 U/L
ANION GAP SERPL CALC-SCNC: 18 MMOL/L
APPEARANCE: CLEAR
AST SERPL-CCNC: 17 U/L
BACTERIA: NEGATIVE
BASOPHILS # BLD AUTO: 0.03 K/UL
BASOPHILS NFR BLD AUTO: 0.2 %
BILIRUB SERPL-MCNC: 0.2 MG/DL
BILIRUBIN URINE: NEGATIVE
BLOOD URINE: NEGATIVE
BUN SERPL-MCNC: 38 MG/DL
CALCIUM SERPL-MCNC: 10 MG/DL
CALCIUM SERPL-MCNC: 10 MG/DL
CHLORIDE SERPL-SCNC: 93 MMOL/L
CHOLEST SERPL-MCNC: 135 MG/DL
CHOLEST/HDLC SERPL: 2.8 RATIO
CO2 SERPL-SCNC: 25 MMOL/L
COLOR: YELLOW
CREAT SERPL-MCNC: 2.19 MG/DL
CREAT SPEC-SCNC: 43 MG/DL
CREAT/PROT UR: 0.6 RATIO
EOSINOPHIL # BLD AUTO: 0.13 K/UL
EOSINOPHIL NFR BLD AUTO: 1 %
FERRITIN SERPL-MCNC: 45 NG/ML
GLUCOSE QUALITATIVE U: NORMAL MG/DL
GLUCOSE SERPL-MCNC: 176 MG/DL
HBA1C MFR BLD HPLC: 7.3 %
HCT VFR BLD CALC: 34 %
HDLC SERPL-MCNC: 48 MG/DL
HGB BLD-MCNC: 10.9 G/DL
IMM GRANULOCYTES NFR BLD AUTO: 0.8 %
IRON SATN MFR SERPL: 25 %
IRON SERPL-MCNC: 104 UG/DL
KETONES URINE: NEGATIVE
LDLC SERPL CALC-MCNC: 46 MG/DL
LEUKOCYTE ESTERASE URINE: NEGATIVE
LYMPHOCYTES # BLD AUTO: 2.61 K/UL
LYMPHOCYTES NFR BLD AUTO: 19.6 %
MAN DIFF?: NORMAL
MCHC RBC-ENTMCNC: 31.1 PG
MCHC RBC-ENTMCNC: 32.1 GM/DL
MCV RBC AUTO: 97.1 FL
MICROSCOPIC-UA: NORMAL
MONOCYTES # BLD AUTO: 0.78 K/UL
MONOCYTES NFR BLD AUTO: 5.9 %
NEUTROPHILS # BLD AUTO: 9.67 K/UL
NEUTROPHILS NFR BLD AUTO: 72.5 %
NITRITE URINE: NEGATIVE
PARATHYROID HORMONE INTACT: 44 PG/ML
PH URINE: 7
PHOSPHATE SERPL-MCNC: 2.6 MG/DL
PLATELET # BLD AUTO: 283 K/UL
POTASSIUM SERPL-SCNC: 4.7 MMOL/L
PROT SERPL-MCNC: 6.7 G/DL
PROT UR-MCNC: 25 MG/DL
PROTEIN URINE: 30 MG/DL
RBC # BLD: 3.5 M/UL
RBC # FLD: 13.8 %
RED BLOOD CELLS URINE: 0 /HPF
SODIUM SERPL-SCNC: 136 MMOL/L
SPECIFIC GRAVITY URINE: 1.01
SQUAMOUS EPITHELIAL CELLS: 0 /HPF
TIBC SERPL-MCNC: 410 UG/DL
TRIGL SERPL-MCNC: 203 MG/DL
UIBC SERPL-MCNC: 306 UG/DL
URATE SERPL-MCNC: 7.1 MG/DL
UROBILINOGEN URINE: NORMAL MG/DL
WBC # FLD AUTO: 13.33 K/UL
WHITE BLOOD CELLS URINE: 0 /HPF

## 2017-10-19 ENCOUNTER — MEDICATION RENEWAL (OUTPATIENT)
Age: 82
End: 2017-10-19

## 2017-12-11 ENCOUNTER — APPOINTMENT (OUTPATIENT)
Dept: PULMONOLOGY | Facility: CLINIC | Age: 82
End: 2017-12-11

## 2018-01-23 ENCOUNTER — APPOINTMENT (OUTPATIENT)
Dept: NEPHROLOGY | Facility: CLINIC | Age: 83
End: 2018-01-23

## 2018-03-09 NOTE — DISCHARGE NOTE ADULT - LAUNCH MEDICATION RECONCILIATION
In 4 days pt. to advance to and tolerate oral diet
<<-----Click here for Discharge Medication Review

## 2023-11-17 NOTE — H&P ADULT - HEMATOLOGY/LYMPHATICS
details…
Photo Preface (Leave Blank If You Do Not Want): Photographs were obtained today
Detail Level: Zone

## 2025-01-13 NOTE — ED ADULT TRIAGE NOTE - BP NONINVASIVE DIASTOLIC (MM HG)
Rx Refill Note  Requested Prescriptions     Pending Prescriptions Disp Refills   • amLODIPine (NORVASC) 10 MG tablet [Pharmacy Med Name: AMLODIPINE BESYLATE 10MG TABLETS] 30 tablet 0     Sig: TAKE 1 TABLET BY MOUTH DAILY      Last office visit with prescribing clinician: 12/16/2024   Last telemedicine visit with prescribing clinician: Visit date not found   Next office visit with prescribing clinician: 1/21/2025       Would you like a call back once the refill request has been completed: [] Yes [] No    If the office needs to give you a call back, can they leave a voicemail: [] Yes [] No    Karissa Cruz MA  01/13/25, 13:16 EST    74